# Patient Record
Sex: MALE | Race: BLACK OR AFRICAN AMERICAN | NOT HISPANIC OR LATINO | Employment: UNEMPLOYED | URBAN - METROPOLITAN AREA
[De-identification: names, ages, dates, MRNs, and addresses within clinical notes are randomized per-mention and may not be internally consistent; named-entity substitution may affect disease eponyms.]

---

## 2024-07-04 ENCOUNTER — HOSPITAL ENCOUNTER (INPATIENT)
Facility: HOSPITAL | Age: 74
LOS: 2 days | Discharge: HOME | DRG: 189 | End: 2024-07-06
Attending: EMERGENCY MEDICINE | Admitting: STUDENT IN AN ORGANIZED HEALTH CARE EDUCATION/TRAINING PROGRAM
Payer: MEDICARE

## 2024-07-04 DIAGNOSIS — J45.21 MILD INTERMITTENT ASTHMA WITH ACUTE EXACERBATION: Primary | ICD-10-CM

## 2024-07-04 DIAGNOSIS — R06.02 SHORTNESS OF BREATH: ICD-10-CM

## 2024-07-04 PROBLEM — D72.10 EOSINOPHILIA: Status: ACTIVE | Noted: 2024-07-04

## 2024-07-04 PROBLEM — J21.9 ACUTE BRONCHIOLITIS: Status: ACTIVE | Noted: 2024-07-04

## 2024-07-04 PROBLEM — J96.02 ACUTE HYPERCAPNIC RESPIRATORY FAILURE: Status: ACTIVE | Noted: 2024-07-04

## 2024-07-04 LAB
ALBUMIN SERPL BCP-MCNC: 3.9 G/DL (ref 3.5–5.2)
ALLENS TEST: ABNORMAL
ALLENS TEST: ABNORMAL
ALP SERPL-CCNC: 86 U/L (ref 55–135)
ALT SERPL W/O P-5'-P-CCNC: 23 U/L (ref 10–44)
ANION GAP SERPL CALC-SCNC: 11 MMOL/L (ref 8–16)
AST SERPL-CCNC: 27 U/L (ref 10–40)
BASOPHILS # BLD AUTO: 0.05 K/UL (ref 0–0.2)
BASOPHILS NFR BLD: 0.5 % (ref 0–1.9)
BILIRUB SERPL-MCNC: 0.6 MG/DL (ref 0.1–1)
BNP SERPL-MCNC: 236 PG/ML (ref 0–99)
BUN SERPL-MCNC: 10 MG/DL (ref 8–23)
CALCIUM SERPL-MCNC: 9.2 MG/DL (ref 8.7–10.5)
CHLORIDE SERPL-SCNC: 103 MMOL/L (ref 95–110)
CO2 SERPL-SCNC: 24 MMOL/L (ref 23–29)
CREAT SERPL-MCNC: 0.9 MG/DL (ref 0.5–1.4)
DELSYS: ABNORMAL
DELSYS: ABNORMAL
DIFFERENTIAL METHOD BLD: ABNORMAL
EOSINOPHIL # BLD AUTO: 1.1 K/UL (ref 0–0.5)
EOSINOPHIL NFR BLD: 10.3 % (ref 0–8)
EP: 8
EP: 8
ERYTHROCYTE [DISTWIDTH] IN BLOOD BY AUTOMATED COUNT: 13.1 % (ref 11.5–14.5)
ERYTHROCYTE [SEDIMENTATION RATE] IN BLOOD BY WESTERGREN METHOD: 8 MM/H
ERYTHROCYTE [SEDIMENTATION RATE] IN BLOOD BY WESTERGREN METHOD: 8 MM/H
EST. GFR  (NO RACE VARIABLE): >60 ML/MIN/1.73 M^2
FIO2: 40
FIO2: 40
GLUCOSE SERPL-MCNC: 187 MG/DL (ref 70–110)
HCO3 UR-SCNC: 27.2 MMOL/L (ref 24–28)
HCO3 UR-SCNC: 28.2 MMOL/L (ref 24–28)
HCT VFR BLD AUTO: 48.7 % (ref 40–54)
HGB BLD-MCNC: 15.3 G/DL (ref 14–18)
IMM GRANULOCYTES # BLD AUTO: 0.04 K/UL (ref 0–0.04)
IMM GRANULOCYTES NFR BLD AUTO: 0.4 % (ref 0–0.5)
IP: 14
IP: 14
LYMPHOCYTES # BLD AUTO: 3.4 K/UL (ref 1–4.8)
LYMPHOCYTES NFR BLD: 32.5 % (ref 18–48)
MCH RBC QN AUTO: 28.7 PG (ref 27–31)
MCHC RBC AUTO-ENTMCNC: 31.4 G/DL (ref 32–36)
MCV RBC AUTO: 91 FL (ref 82–98)
MODE: ABNORMAL
MODE: ABNORMAL
MONOCYTES # BLD AUTO: 0.6 K/UL (ref 0.3–1)
MONOCYTES NFR BLD: 6 % (ref 4–15)
NEUTROPHILS # BLD AUTO: 5.3 K/UL (ref 1.8–7.7)
NEUTROPHILS NFR BLD: 50.3 % (ref 38–73)
NRBC BLD-RTO: 0 /100 WBC
OHS QRS DURATION: 166 MS
OHS QTC CALCULATION: 491 MS
PCO2 BLDA: 60.6 MMHG (ref 35–45)
PCO2 BLDA: 69.1 MMHG (ref 35–45)
PH SMN: 7.22 [PH] (ref 7.35–7.45)
PH SMN: 7.26 [PH] (ref 7.35–7.45)
PLATELET # BLD AUTO: 250 K/UL (ref 150–450)
PMV BLD AUTO: 10.2 FL (ref 9.2–12.9)
PO2 BLDA: 120 MMHG (ref 40–60)
PO2 BLDA: 195 MMHG (ref 80–100)
POC BE: -2 MMOL/L
POC BE: -2 MMOL/L
POC SATURATED O2: 98 % (ref 95–100)
POC SATURATED O2: 99 % (ref 95–100)
POC TCO2: 29 MMOL/L (ref 23–27)
POC TCO2: 30 MMOL/L (ref 24–29)
POTASSIUM SERPL-SCNC: 4.4 MMOL/L (ref 3.5–5.1)
PROT SERPL-MCNC: 8.8 G/DL (ref 6–8.4)
RBC # BLD AUTO: 5.34 M/UL (ref 4.6–6.2)
SAMPLE: ABNORMAL
SAMPLE: ABNORMAL
SITE: ABNORMAL
SITE: ABNORMAL
SODIUM SERPL-SCNC: 138 MMOL/L (ref 136–145)
TROPONIN I SERPL DL<=0.01 NG/ML-MCNC: 0.01 NG/ML (ref 0–0.03)
WBC # BLD AUTO: 10.47 K/UL (ref 3.9–12.7)

## 2024-07-04 PROCEDURE — 99900035 HC TECH TIME PER 15 MIN (STAT)

## 2024-07-04 PROCEDURE — 11000001 HC ACUTE MED/SURG PRIVATE ROOM

## 2024-07-04 PROCEDURE — 94640 AIRWAY INHALATION TREATMENT: CPT

## 2024-07-04 PROCEDURE — 83880 ASSAY OF NATRIURETIC PEPTIDE: CPT | Performed by: EMERGENCY MEDICINE

## 2024-07-04 PROCEDURE — 99291 CRITICAL CARE FIRST HOUR: CPT

## 2024-07-04 PROCEDURE — 63700000 PHARM REV CODE 250 ALT 637 W/O HCPCS: Performed by: INTERNAL MEDICINE

## 2024-07-04 PROCEDURE — 27000190 HC CPAP FULL FACE MASK W/VALVE

## 2024-07-04 PROCEDURE — 94644 CONT INHLJ TX 1ST HOUR: CPT

## 2024-07-04 PROCEDURE — 63600175 PHARM REV CODE 636 W HCPCS: Performed by: INTERNAL MEDICINE

## 2024-07-04 PROCEDURE — 5A09357 ASSISTANCE WITH RESPIRATORY VENTILATION, LESS THAN 24 CONSECUTIVE HOURS, CONTINUOUS POSITIVE AIRWAY PRESSURE: ICD-10-PCS | Performed by: EMERGENCY MEDICINE

## 2024-07-04 PROCEDURE — 80053 COMPREHEN METABOLIC PANEL: CPT | Performed by: EMERGENCY MEDICINE

## 2024-07-04 PROCEDURE — 27000221 HC OXYGEN, UP TO 24 HOURS

## 2024-07-04 PROCEDURE — 93010 ELECTROCARDIOGRAM REPORT: CPT | Mod: ,,, | Performed by: INTERNAL MEDICINE

## 2024-07-04 PROCEDURE — 82803 BLOOD GASES ANY COMBINATION: CPT

## 2024-07-04 PROCEDURE — 25000242 PHARM REV CODE 250 ALT 637 W/ HCPCS: Performed by: EMERGENCY MEDICINE

## 2024-07-04 PROCEDURE — 96365 THER/PROPH/DIAG IV INF INIT: CPT

## 2024-07-04 PROCEDURE — 63600175 PHARM REV CODE 636 W HCPCS: Performed by: EMERGENCY MEDICINE

## 2024-07-04 PROCEDURE — 99223 1ST HOSP IP/OBS HIGH 75: CPT | Mod: ,,, | Performed by: INTERNAL MEDICINE

## 2024-07-04 PROCEDURE — 94761 N-INVAS EAR/PLS OXIMETRY MLT: CPT | Mod: XB

## 2024-07-04 PROCEDURE — 63600175 PHARM REV CODE 636 W HCPCS: Performed by: STUDENT IN AN ORGANIZED HEALTH CARE EDUCATION/TRAINING PROGRAM

## 2024-07-04 PROCEDURE — 36600 WITHDRAWAL OF ARTERIAL BLOOD: CPT

## 2024-07-04 PROCEDURE — 87633 RESP VIRUS 12-25 TARGETS: CPT | Performed by: INTERNAL MEDICINE

## 2024-07-04 PROCEDURE — 85025 COMPLETE CBC W/AUTO DIFF WBC: CPT | Performed by: EMERGENCY MEDICINE

## 2024-07-04 PROCEDURE — 25000242 PHARM REV CODE 250 ALT 637 W/ HCPCS: Performed by: INTERNAL MEDICINE

## 2024-07-04 PROCEDURE — 84484 ASSAY OF TROPONIN QUANT: CPT | Performed by: EMERGENCY MEDICINE

## 2024-07-04 PROCEDURE — 96375 TX/PRO/DX INJ NEW DRUG ADDON: CPT

## 2024-07-04 PROCEDURE — 94799 UNLISTED PULMONARY SVC/PX: CPT

## 2024-07-04 PROCEDURE — 93005 ELECTROCARDIOGRAM TRACING: CPT

## 2024-07-04 RX ORDER — ALBUTEROL SULFATE 2.5 MG/.5ML
2.5 SOLUTION RESPIRATORY (INHALATION) EVERY 4 HOURS PRN
Status: DISCONTINUED | OUTPATIENT
Start: 2024-07-04 | End: 2024-07-06 | Stop reason: HOSPADM

## 2024-07-04 RX ORDER — SODIUM CHLORIDE 0.9 % (FLUSH) 0.9 %
3 SYRINGE (ML) INJECTION
Status: DISCONTINUED | OUTPATIENT
Start: 2024-07-04 | End: 2024-07-06 | Stop reason: HOSPADM

## 2024-07-04 RX ORDER — ACETAMINOPHEN 325 MG/1
650 TABLET ORAL EVERY 4 HOURS PRN
Status: DISCONTINUED | OUTPATIENT
Start: 2024-07-04 | End: 2024-07-06 | Stop reason: HOSPADM

## 2024-07-04 RX ORDER — ALBUTEROL SULFATE 2.5 MG/.5ML
10 SOLUTION RESPIRATORY (INHALATION)
Status: COMPLETED | OUTPATIENT
Start: 2024-07-04 | End: 2024-07-04

## 2024-07-04 RX ORDER — IPRATROPIUM BROMIDE AND ALBUTEROL SULFATE 2.5; .5 MG/3ML; MG/3ML
3 SOLUTION RESPIRATORY (INHALATION)
Status: COMPLETED | OUTPATIENT
Start: 2024-07-04 | End: 2024-07-04

## 2024-07-04 RX ORDER — ENOXAPARIN SODIUM 100 MG/ML
40 INJECTION SUBCUTANEOUS EVERY 24 HOURS
Status: DISCONTINUED | OUTPATIENT
Start: 2024-07-04 | End: 2024-07-06 | Stop reason: HOSPADM

## 2024-07-04 RX ORDER — ALBUTEROL SULFATE 2.5 MG/.5ML
7.5 SOLUTION RESPIRATORY (INHALATION)
Status: COMPLETED | OUTPATIENT
Start: 2024-07-04 | End: 2024-07-04

## 2024-07-04 RX ORDER — DEXAMETHASONE SODIUM PHOSPHATE 4 MG/ML
10 INJECTION, SOLUTION INTRA-ARTICULAR; INTRALESIONAL; INTRAMUSCULAR; INTRAVENOUS; SOFT TISSUE
Status: COMPLETED | OUTPATIENT
Start: 2024-07-04 | End: 2024-07-04

## 2024-07-04 RX ORDER — MAGNESIUM SULFATE HEPTAHYDRATE 40 MG/ML
2 INJECTION, SOLUTION INTRAVENOUS ONCE
Status: COMPLETED | OUTPATIENT
Start: 2024-07-04 | End: 2024-07-04

## 2024-07-04 RX ORDER — AZITHROMYCIN 250 MG/1
500 TABLET, FILM COATED ORAL DAILY
Status: DISCONTINUED | OUTPATIENT
Start: 2024-07-04 | End: 2024-07-06 | Stop reason: HOSPADM

## 2024-07-04 RX ORDER — BUDESONIDE 0.25 MG/2ML
0.25 INHALANT ORAL EVERY 12 HOURS
Status: DISCONTINUED | OUTPATIENT
Start: 2024-07-04 | End: 2024-07-06 | Stop reason: HOSPADM

## 2024-07-04 RX ORDER — METHYLPREDNISOLONE SOD SUCC 125 MG
125 VIAL (EA) INJECTION EVERY 6 HOURS
Status: COMPLETED | OUTPATIENT
Start: 2024-07-04 | End: 2024-07-05

## 2024-07-04 RX ADMIN — IPRATROPIUM BROMIDE AND ALBUTEROL SULFATE 3 ML: 2.5; .5 SOLUTION RESPIRATORY (INHALATION) at 10:07

## 2024-07-04 RX ADMIN — METHYLPREDNISOLONE SODIUM SUCCINATE 125 MG: 125 INJECTION, POWDER, FOR SOLUTION INTRAMUSCULAR; INTRAVENOUS at 05:07

## 2024-07-04 RX ADMIN — BUDESONIDE 0.25 MG: 0.25 INHALANT RESPIRATORY (INHALATION) at 08:07

## 2024-07-04 RX ADMIN — ENOXAPARIN SODIUM 40 MG: 40 INJECTION SUBCUTANEOUS at 05:07

## 2024-07-04 RX ADMIN — ALBUTEROL SULFATE 10 MG: 2.5 SOLUTION RESPIRATORY (INHALATION) at 01:07

## 2024-07-04 RX ADMIN — ALBUTEROL SULFATE 7.5 MG: 2.5 SOLUTION RESPIRATORY (INHALATION) at 10:07

## 2024-07-04 RX ADMIN — DEXAMETHASONE SODIUM PHOSPHATE 10 MG: 4 INJECTION INTRA-ARTICULAR; INTRALESIONAL; INTRAMUSCULAR; INTRAVENOUS; SOFT TISSUE at 10:07

## 2024-07-04 RX ADMIN — METHYLPREDNISOLONE SODIUM SUCCINATE 125 MG: 125 INJECTION, POWDER, FOR SOLUTION INTRAMUSCULAR; INTRAVENOUS at 11:07

## 2024-07-04 RX ADMIN — AZITHROMYCIN DIHYDRATE 500 MG: 250 TABLET ORAL at 12:07

## 2024-07-04 RX ADMIN — MAGNESIUM SULFATE HEPTAHYDRATE 2 G: 40 INJECTION, SOLUTION INTRAVENOUS at 11:07

## 2024-07-04 NOTE — NURSING
Pt arrived to the unit via stretcher. Pt on 5L NC, but able to ambulate to bed. /77 p. 85. Wife at bedside.  All questions answered. NAD noted.

## 2024-07-04 NOTE — SUBJECTIVE & OBJECTIVE
Past Medical History:   Diagnosis Date    Asthma        History reviewed. No pertinent surgical history.    Review of patient's allergies indicates:  No Known Allergies    No current facility-administered medications on file prior to encounter.     No current outpatient medications on file prior to encounter.     Family History    None       Tobacco Use    Smoking status: Former     Types: Cigarettes    Smokeless tobacco: Never   Substance and Sexual Activity    Alcohol use: Not on file    Drug use: Never    Sexual activity: Not Currently     Review of Systems  Objective:     Vital Signs (Most Recent):  Temp: 97.1 °F (36.2 °C) (07/04/24 1032)  Pulse: 108 (07/04/24 1435)  Resp: 20 (07/04/24 1435)  BP: 131/72 (07/04/24 1432)  SpO2: 99 % (07/04/24 1435) Vital Signs (24h Range):  Temp:  [97.1 °F (36.2 °C)] 97.1 °F (36.2 °C)  Pulse:  [100-122] 108  Resp:  [19-24] 20  SpO2:  [96 %-100 %] 99 %  BP: (120-172)/() 131/72     Weight: 103.1 kg (227 lb 4.7 oz)  Body mass index is 31.7 kg/m².     Physical Exam  Vitals and nursing note reviewed.   Constitutional:       General: He is not in acute distress.     Appearance: Normal appearance. He is ill-appearing.   Cardiovascular:      Rate and Rhythm: Normal rate and regular rhythm.      Pulses: Normal pulses.   Pulmonary:      Effort: Pulmonary effort is normal.      Breath sounds: Wheezing present.   Abdominal:      General: Bowel sounds are normal. There is no distension.   Musculoskeletal:         General: No swelling.   Neurological:      General: No focal deficit present.      Mental Status: He is alert.   Psychiatric:         Mood and Affect: Mood normal.                Significant Labs: All pertinent labs within the past 24 hours have been reviewed.    Significant Imaging: I have reviewed all pertinent imaging results/findings within the past 24 hours.

## 2024-07-04 NOTE — H&P
"  [facility] Medicine  History & Physical    Patient Name: Kai Guerrero  MRN: [id number]  Patient Class: IP- Inpatient  Admission Date: 7/4/2024  Attending Physician: David Best MD   Primary Care Provider: No primary care provider on file.         Patient information was obtained from patient, spouse/SO, past medical records, and ER records.     Subjective:     Principal Problem:Mild intermittent asthma with acute exacerbation    Chief Complaint:   Chief Complaint   Patient presents with    Shortness of Breath        HPI: Mr. Guerrero is a 75 yo M with PMHx reportedly asthma who presents to the ED for evaluation of shortness of breath. He reports this has been going on for a "long time" but he was being stubborn and did not want to come. He then decided to be evaluated due to encouragement from others. He denies any fevers or chills, no recent sick contacts. He reports having a similar episode about 2 years ago. He has an albuterol inhaler that he uses as needed but no maintenance inhalers that he takes scheduled daily. He was given IV steroids, continuous nebulizers and supplemental oxygen in the Ed. He is now on nasal cannula and overall feeling better than on presentation. He has continued wheezing and oxygen requirements so he will be admitted to the hospital for further management.    Past Medical History:   Diagnosis Date    Asthma        History reviewed. No pertinent surgical history.    Review of patient's allergies indicates:  No Known Allergies    No current facility-administered medications on file prior to encounter.     No current outpatient medications on file prior to encounter.     Family History    None       Tobacco Use    Smoking status: Former     Types: Cigarettes    Smokeless tobacco: Never   Substance and Sexual Activity    Alcohol use: Not on file    Drug use: Never    Sexual activity: Not Currently     Review of Systems  Objective:     Vital Signs (Most Recent):  Temp: " 97.1 °F (36.2 °C) (07/04/24 1032)  Pulse: 108 (07/04/24 1435)  Resp: 20 (07/04/24 1435)  BP: 131/72 (07/04/24 1432)  SpO2: 99 % (07/04/24 1435) Vital Signs (24h Range):  Temp:  [97.1 °F (36.2 °C)] 97.1 °F (36.2 °C)  Pulse:  [100-122] 108  Resp:  [19-24] 20  SpO2:  [96 %-100 %] 99 %  BP: (120-172)/() 131/72     Weight: 103.1 kg (227 lb 4.7 oz)  Body mass index is 31.7 kg/m².     Physical Exam  Vitals and nursing note reviewed.   Constitutional:       General: He is not in acute distress.     Appearance: Normal appearance. He is ill-appearing.   Cardiovascular:      Rate and Rhythm: Normal rate and regular rhythm.      Pulses: Normal pulses.   Pulmonary:      Effort: Pulmonary effort is normal.      Breath sounds: Wheezing present.   Abdominal:      General: Bowel sounds are normal. There is no distension.   Musculoskeletal:         General: No swelling.   Neurological:      General: No focal deficit present.      Mental Status: He is alert.   Psychiatric:         Mood and Affect: Mood normal.                Significant Labs: All pertinent labs within the past 24 hours have been reviewed.    Significant Imaging: I have reviewed all pertinent imaging results/findings within the past 24 hours.  Assessment/Plan:     * Mild intermittent asthma with acute exacerbation  - Continue with solumedrol 125 q6 per Pulmonology  - budesonide bid  - PRN albuterol nebulizers  - RIP pending  - azithromycin      Eosinophilia  Recheck as outpatient without exacerbation      Acute bronchiolitis        Acute hypercapnic respiratory failure  Patient with Hypercapnic and Hypoxic Respiratory failure which is Acute.  he is not on home oxygen. Supplemental oxygen was provided and noted-      .   Signs/symptoms of respiratory failure include- tachypnea. Contributing diagnoses includes -  copd/asthma?   Labs and images were reviewed. Patient Has recent ABG, which has been reviewed. Will treat underlying causes and adjust management of  respiratory failure as follows-   - treat underlying issues as above  - needs PFT as outpatient      VTE Risk Mitigation (From admission, onward)           Ordered     IP VTE HIGH RISK PATIENT  Once         07/04/24 1448     Place sequential compression device  Until discontinued         07/04/24 1448                                    David Best MD  Department of Hospital Medicine  [facility] [place] - [facility]

## 2024-07-04 NOTE — ASSESSMENT & PLAN NOTE
Possibly asthma related. ? Exposure.  Needs to be repeated outpatient outside of acute exacerbation.

## 2024-07-04 NOTE — ASSESSMENT & PLAN NOTE
Patient with Hypercapnic and Hypoxic Respiratory failure which is Acute.  he is not on home oxygen. Supplemental oxygen was provided and noted-      .   Signs/symptoms of respiratory failure include- tachypnea. Contributing diagnoses includes -  copd/asthma?   Labs and images were reviewed. Patient Has recent ABG, which has been reviewed. Will treat underlying causes and adjust management of respiratory failure as follows-   - treat underlying issues as above  - needs PFT as outpatient

## 2024-07-04 NOTE — ASSESSMENT & PLAN NOTE
Patient with Hypercapnic Respiratory failure which is Acute.  he is not on home oxygen. Supplemental oxygen was provided and noted-      .   Signs/symptoms of respiratory failure include- tachypnea, increased work of breathing, and respiratory distress. Contributing diagnoses includes -  Asthma, Bronchiolitis  Labs and images were reviewed. Patient Has recent ABG, which has been reviewed. Will treat underlying causes and adjust management of respiratory failure as follows- BiPAP to continue and reduce O2 to minimize CO2 retention.

## 2024-07-04 NOTE — ASSESSMENT & PLAN NOTE
- Continue with solumedrol 125 q6 per Pulmonology  - budesonide bid  - PRN albuterol nebulizers  - RIP pending  - azithromycin

## 2024-07-04 NOTE — HPI
"Mr. Guerrero is a 75 yo M with PMHx reportedly asthma who presents to the ED for evaluation of shortness of breath. He reports this has been going on for a "long time" but he was being stubborn and did not want to come. He then decided to be evaluated due to encouragement from others. He denies any fevers or chills, no recent sick contacts. He reports having a similar episode about 2 years ago. He has an albuterol inhaler that he uses as needed but no maintenance inhalers that he takes scheduled daily. He was given IV steroids, continuous nebulizers and supplemental oxygen in the Ed. He is now on nasal cannula and overall feeling better than on presentation. He has continued wheezing and oxygen requirements so he will be admitted to the hospital for further management.  "

## 2024-07-04 NOTE — SUBJECTIVE & OBJECTIVE
Past Medical History:   Diagnosis Date    Asthma        History reviewed. No pertinent surgical history.    Review of patient's allergies indicates:  No Known Allergies    Family History    None       Tobacco Use    Smoking status: Former     Types: Cigarettes    Smokeless tobacco: Never   Substance and Sexual Activity    Alcohol use: Not on file    Drug use: Never    Sexual activity: Not Currently         Review of Systems   Unable to perform ROS: Severe respiratory distress     Objective:     Vital Signs (Most Recent):  Temp: 97.1 °F (36.2 °C) (07/04/24 1032)  Pulse: 107 (07/04/24 1154)  Resp: (!) 21 (07/04/24 1154)  BP: 120/69 (07/04/24 1133)  SpO2: 100 % (07/04/24 1154) Vital Signs (24h Range):  Temp:  [97.1 °F (36.2 °C)] 97.1 °F (36.2 °C)  Pulse:  [107-122] 107  Resp:  [19-24] 21  SpO2:  [96 %-100 %] 100 %  BP: (120-172)/() 120/69     Weight: 103.1 kg (227 lb 4.7 oz)  Body mass index is 31.7 kg/m².    No intake or output data in the 24 hours ending 07/04/24 1221     Physical Exam  Vitals reviewed.   Constitutional:       General: He is in acute distress.      Appearance: He is normal weight. He is ill-appearing.   HENT:      Head: Normocephalic and atraumatic.      Mouth/Throat:      Comments: BiPAP mask in place without air leak  Eyes:      Extraocular Movements: Extraocular movements intact.      Pupils: Pupils are equal, round, and reactive to light.   Cardiovascular:      Rate and Rhythm: Regular rhythm. Tachycardia present.      Pulses: Normal pulses.      Heart sounds: Normal heart sounds.   Pulmonary:      Effort: Respiratory distress present.      Comments: BiPAP dependent.  Severe musical inspiratory and expiratory wheezing.   Multiple chest wall keloids.   Abdominal:      General: Abdomen is flat.      Palpations: Abdomen is soft.   Musculoskeletal:      Right lower leg: No edema.      Left lower leg: No edema.   Skin:     General: Skin is warm and dry.      Capillary Refill: Capillary refill  takes less than 2 seconds.   Neurological:      General: No focal deficit present.      Mental Status: He is alert.      Comments: Slightly disoriented/ unable to answer specific questions which I suspect is more related to respiratory distress   Psychiatric:         Mood and Affect: Mood normal.         Behavior: Behavior normal.          Vents:       Lines/Drains/Airways       Peripheral Intravenous Line  Duration                  Peripheral IV - Single Lumen 07/04/24 1032 20 G Anterior;Left Forearm <1 day         Peripheral IV - Single Lumen 07/04/24 1045 20 G Right Antecubital <1 day                    Significant Labs:    CBC/Anemia Profile:  Recent Labs   Lab 07/04/24  1059   WBC 10.47   HGB 15.3   HCT 48.7      MCV 91   RDW 13.1        Chemistries:  Recent Labs   Lab 07/04/24  1059      K 4.4      CO2 24   BUN 10   CREATININE 0.9   CALCIUM 9.2   ALBUMIN 3.9   PROT 8.8*   BILITOT 0.6   ALKPHOS 86   ALT 23   AST 27       ABGs:   Recent Labs   Lab 07/04/24  1153   PH 7.259*   PCO2 60.6*   HCO3 27.2   POCSATURATED 99   BE -2     All pertinent labs within the past 24 hours have been reviewed.    Significant Imaging:   I have reviewed all pertinent imaging results/findings within the past 24 hours.  CXR: I have reviewed all pertinent results/findings within the past 24 hours and my personal findings are:  bilateral interstitial infiltrates worse in bases  Tely shows sinus tachycardia

## 2024-07-04 NOTE — ED PROVIDER NOTES
Encounter Date: 7/4/2024       History     Chief Complaint   Patient presents with    Shortness of Breath     HPI  This 74-year-old asthmatic presents emergency room with shortness of breath and bilateral wheezing transported by EMS.  The patient denies chest pain ankle swelling.  He is otherwise well he has had symptoms for about a week.  He arrives on BiPAP.  He has no other complaints.  There is no history of fever sputum production.  Review of patient's allergies indicates:  No Known Allergies  Past Medical History:   Diagnosis Date    Asthma      History reviewed. No pertinent surgical history.  No family history on file.  Social History     Tobacco Use    Smoking status: Former     Types: Cigarettes    Smokeless tobacco: Never   Substance Use Topics    Drug use: Never     Review of Systems  The patient was questioned specifically with regard to the following.  General: Fever, chills, sweats. Neuro: Headache. Eyes: eye problems. ENT: Ear pain, sore throat. Cardiovascular: Chest pain. Respiratory: Cough, shortness of breath. Gastrointestinal: Abdominal pain, vomiting, diarrhea. Genitourinary: Painful urination.  Musculoskeletal: Arm and leg problems. Skin: Rash.  The review of systems was negative except for the following:  Shortness of breath  Physical Exam     Initial Vitals [07/04/24 1032]   BP Pulse Resp Temp SpO2   (!) 166/120 (!) 117 (!) 24 97.1 °F (36.2 °C) 100 %      MAP       --         Physical Exam  The patient was examined specifically for the following:   General:No significant distress, Good color, Warm and dry. Head and neck:Scalp atraumatic, Neck supple. Neurological:Appropriate conversation, Gross motor deficits. Eyes:Conjugate gaze, Clear corneas. ENT: No epistaxis. Cardiac: Regular rate and rhythm, Grossly normal heart tones. Pulmonary: Wheezing, Rales. Gastrointestinal: Abdominal tenderness, Abdominal distention. Musculoskeletal: Extremity deformity, Apparent pain with range of motion of the  joints. Skin: Rash.   The findings on examination were normal except for the following:   ED Course   Critical Care    Date/Time: 7/4/2024 1:49 PM    Performed by: Marc Vincent MD  Authorized by: Marc Vincent MD  Direct patient critical care time: 22 minutes  Additional history critical care time: 11 minutes  Ordering / reviewing critical care time: 11 minutes  Documentation critical care time: 11 minutes  Consulting other physicians critical care time: 11 minutes  Total critical care time (exclusive of procedural time) : 66 minutes  Critical care time was exclusive of separately billable procedures and treating other patients and teaching time.  Critical care was necessary to treat or prevent imminent or life-threatening deterioration of the following conditions: respiratory failure.  Critical care was time spent personally by me on the following activities: development of treatment plan with patient or surrogate, discussions with primary provider, interpretation of cardiac output measurements, evaluation of patient's response to treatment, examination of patient, obtaining history from patient or surrogate, ordering and performing treatments and interventions, ordering and review of laboratory studies, ordering and review of radiographic studies, pulse oximetry, re-evaluation of patient's condition and review of old charts.        Labs Reviewed   B-TYPE NATRIURETIC PEPTIDE - Abnormal; Notable for the following components:       Result Value     (*)     All other components within normal limits   CBC W/ AUTO DIFFERENTIAL - Abnormal; Notable for the following components:    MCHC 31.4 (*)     Eos # 1.1 (*)     Eosinophil % 10.3 (*)     All other components within normal limits   COMPREHENSIVE METABOLIC PANEL - Abnormal; Notable for the following components:    Glucose 187 (*)     Total Protein 8.8 (*)     All other components within normal limits   ISTAT PROCEDURE - Abnormal; Notable for the  following components:    POC PH 7.218 (*)     POC PCO2 69.1 (*)     POC PO2 120 (*)     POC HCO3 28.2 (*)     POC TCO2 30 (*)     All other components within normal limits   ISTAT PROCEDURE - Abnormal; Notable for the following components:    POC PH 7.259 (*)     POC PCO2 60.6 (*)     POC PO2 195 (*)     POC TCO2 29 (*)     All other components within normal limits   RESPIRATORY INFECTION PANEL (PCR), NASOPHARYNGEAL   TROPONIN I     EKG Readings: (Independently Interpreted)   This patient is in a sinus tachycardia with a heart rate of 121.  There are nonspecific ST segment and T-wave changes there is no definite evidence of acute myocardial infarction or malignant arrhythmia.  There is right axis deviation there is a left bundle branch block.  This is doctor Vincent dictating an I independently interpreted this EKG.        Imaging Results              X-Ray Chest AP Portable (In process)                      Medications   budesonide nebulizer solution 0.25 mg (has no administration in time range)   methylPREDNISolone sodium succinate injection 125 mg (has no administration in time range)   azithromycin tablet 500 mg (500 mg Oral Given 7/4/24 1253)   albuterol sulfate nebulizer solution 2.5 mg (has no administration in time range)   dexAMETHasone injection 10 mg (10 mg Intravenous Given 7/4/24 1059)   albuterol-ipratropium 2.5 mg-0.5 mg/3 mL nebulizer solution 3 mL (3 mLs Nebulization Given 7/4/24 1056)   albuterol sulfate nebulizer solution 7.5 mg (7.5 mg Nebulization Given 7/4/24 1056)   magnesium sulfate 2g in water 50mL IVPB (premix) (2 g Intravenous Back Association 7/4/24 1200)   albuterol sulfate nebulizer solution 10 mg (10 mg Nebulization Given 7/4/24 1321)     Medical Decision Making  Amount and/or Complexity of Data Reviewed  Labs: ordered.  Radiology: ordered.    Risk  Prescription drug management.    Given the above, this patient presents emergency with severe asthma.  He was treated with multiple  nebulizer treatments magnesium IV steroids and he improved markedly during stay in the emergency room.  We were able to get him off of BiPAP.  We were able to convert him to a high-flow nasal cannula.  I believe this patient is stable for the floor.  Chest x-ray fails to reveal infiltrates by my interpretation.  The patient reports no shortness of breath at 1:48 p.m. me is 98% on his high-flow nasal cannula.  Chest x-ray fails to reveal evidence of pulmonary edema.  The patient did have a acidosis with a pH is 7.25 on arrival.  His BNP is slightly elevated to 36 I doubt congestive heart failure the troponin is negative I doubt myocardial infarction chemistries are interesting for glucose of 187.  The patient may have some borderline diabetes [place] [id number] hemoglobin and hematocrit are normal.  Chest x-ray is unremarkable by my interpretation.                                  Clinical Impression:  Final diagnoses:  [R06.02] Shortness of breath                 Marc Vicnent MD  07/04/24 9560

## 2024-07-04 NOTE — ASSESSMENT & PLAN NOTE
Once outside of acute exacerbation will need to better clarify baseline symptoms.  Will need baseline PFTs as outpatient.  Based on infectious workup, will determine outpatient inhaler maintenance for patient.  Albuterol nebs as needed and budesonide nebs scheduled.

## 2024-07-04 NOTE — CONSULTS
[facility] Emergency Dept  Pulmonology  Consult Note    Patient Name: Kai Guerrero  MRN: [id number]  Admission Date: 7/4/2024  Hospital Length of Stay: 0 days  Code Status: No Order  Attending Physician: Marc Vincent MD  Primary Care Provider: No primary care provider on file.   Principal Problem: <principal problem not specified>    Inpatient consult to Critical Care Medicine  Consult performed by: Jewel Street MD  Consult ordered by: Marc Vincent MD      Inpatient consult to Pulmonology  Consult performed by: Jewel Street MD  Consult ordered by: Marc Vincent MD        Subjective:     HPI:  75 yo male with a reported history of asthma that started an unknown period ago, prior chest wall injuries from [place] who presented with respiratory distress and shortness of breath. He is currently on BiPAP and is much more comfortable but still only able to speak in short sentences and unable to provide a full history. Prior smoking history but quit years ago. Has a diagnosis of asthma with unknown timeline and only uses as needed albuterol inhaler. Told ER had 3 days of symptoms. Only able to tell me he has been sick for much longer.   HR has been mildly elevated consistent with albuterol nebulizer usage in ED.    Past Medical History:   Diagnosis Date    Asthma        History reviewed. No pertinent surgical history.    Review of patient's allergies indicates:  No Known Allergies    Family History    None       Tobacco Use    Smoking status: Former     Types: Cigarettes    Smokeless tobacco: Never   Substance and Sexual Activity    Alcohol use: Not on file    Drug use: Never    Sexual activity: Not Currently         Review of Systems   Unable to perform ROS: Severe respiratory distress     Objective:     Vital Signs (Most Recent):  Temp: 97.1 °F (36.2 °C) (07/04/24 1032)  Pulse: 107 (07/04/24 1154)  Resp: (!) 21 (07/04/24 1154)  BP: 120/69 (07/04/24 1133)  SpO2: 100 % (07/04/24 1154) Vital  Signs (24h Range):  Temp:  [97.1 °F (36.2 °C)] 97.1 °F (36.2 °C)  Pulse:  [107-122] 107  Resp:  [19-24] 21  SpO2:  [96 %-100 %] 100 %  BP: (120-172)/() 120/69     Weight: 103.1 kg (227 lb 4.7 oz)  Body mass index is 31.7 kg/m².    No intake or output data in the 24 hours ending 07/04/24 1221     Physical Exam  Vitals reviewed.   Constitutional:       General: He is in acute distress.      Appearance: He is normal weight. He is ill-appearing.   HENT:      Head: Normocephalic and atraumatic.      Mouth/Throat:      Comments: BiPAP mask in place without air leak  Eyes:      Extraocular Movements: Extraocular movements intact.      Pupils: Pupils are equal, round, and reactive to light.   Cardiovascular:      Rate and Rhythm: Regular rhythm. Tachycardia present.      Pulses: Normal pulses.      Heart sounds: Normal heart sounds.   Pulmonary:      Effort: Respiratory distress present.      Comments: BiPAP dependent.  Severe musical inspiratory and expiratory wheezing.   Multiple chest wall keloids.   Abdominal:      General: Abdomen is flat.      Palpations: Abdomen is soft.   Musculoskeletal:      Right lower leg: No edema.      Left lower leg: No edema.   Skin:     General: Skin is warm and dry.      Capillary Refill: Capillary refill takes less than 2 seconds.   Neurological:      General: No focal deficit present.      Mental Status: He is alert.      Comments: Slightly disoriented/ unable to answer specific questions which I suspect is more related to respiratory distress   Psychiatric:         Mood and Affect: Mood normal.         Behavior: Behavior normal.          Vents:       Lines/Drains/Airways       Peripheral Intravenous Line  Duration                  Peripheral IV - Single Lumen 07/04/24 1032 20 G Anterior;Left Forearm <1 day         Peripheral IV - Single Lumen 07/04/24 1045 20 G Right Antecubital <1 day                    Significant Labs:    CBC/Anemia Profile:  Recent Labs   Lab 07/04/24  1059    WBC 10.47   HGB 15.3   HCT 48.7      MCV 91   RDW 13.1        Chemistries:  Recent Labs   Lab 07/04/24  1059      K 4.4      CO2 24   BUN 10   CREATININE 0.9   CALCIUM 9.2   ALBUMIN 3.9   PROT 8.8*   BILITOT 0.6   ALKPHOS 86   ALT 23   AST 27       ABGs:   Recent Labs   Lab 07/04/24  1153   PH 7.259*   PCO2 60.6*   HCO3 27.2   POCSATURATED 99   BE -2     All pertinent labs within the past 24 hours have been reviewed.    Significant Imaging:   I have reviewed all pertinent imaging results/findings within the past 24 hours.  CXR: I have reviewed all pertinent results/findings within the past 24 hours and my personal findings are:  bilateral interstitial infiltrates worse in bases  Tely shows sinus tachycardia    ABG  Recent Labs   Lab 07/04/24  1153   PH 7.259*   PO2 195*   PCO2 60.6*   HCO3 27.2   BE -2     Assessment/Plan:     Pulmonary  Mild intermittent asthma with acute exacerbation    Once outside of acute exacerbation will need to better clarify baseline symptoms.  Will need baseline PFTs as outpatient.  Based on infectious workup, will determine outpatient inhaler maintenance for patient.  Albuterol nebs as needed and budesonide nebs scheduled.    Acute bronchiolitis  Budesonide Nebs BID.  Azithromycin 500 daily  Resp PCR  Solumedrol 125 q6 x 24 hours    Acute hypercapnic respiratory failure  Patient with Hypercapnic Respiratory failure which is Acute.  he is not on home oxygen. Supplemental oxygen was provided and noted-      .   Signs/symptoms of respiratory failure include- tachypnea, increased work of breathing, and respiratory distress. Contributing diagnoses includes -  Asthma, Bronchiolitis  Labs and images were reviewed. Patient Has recent ABG, which has been reviewed. Will treat underlying causes and adjust management of respiratory failure as follows- BiPAP to continue and reduce O2 to minimize CO2 retention.     Oncology  Eosinophilia  Possibly asthma related. ? Exposure.  Needs  to be repeated outpatient outside of acute exacerbation.          Thank you for your consult. I will follow-up with patient. Please contact us if you have any additional questions.     Jewel Street MD  Pulmonology  [facility] - Emergency Dept

## 2024-07-04 NOTE — HPI
73 yo male with a reported history of asthma that started an unknown period ago, prior chest wall injuries from [place] who presented with respiratory distress and shortness of breath. He is currently on BiPAP and is much more comfortable but still only able to speak in short sentences and unable to provide a full history. Prior smoking history but quit years ago. Has a diagnosis of asthma with unknown timeline and only uses as needed albuterol inhaler. Told ER had 3 days of symptoms. Only able to tell me he has been sick for much longer.   HR has been mildly elevated consistent with albuterol nebulizer usage in ED.

## 2024-07-04 NOTE — NURSING
[facility]  Nurses Note -- 4 Eyes      7/4/2024       Skin assessed on: Q Shift      [x] No Pressure Injuries Present    []Prevention Measures Documented    [] Yes LDA  for Pressure Injury Previously documented     [] Yes New Pressure Injury Discovered   [] LDA for New Pressure Injury Added      Attending RN:  Timothy Kevin LPN     Second RN:  Kelsey RAJAN

## 2024-07-04 NOTE — ED TRIAGE NOTES
Pt presents to ED from home via EMS with c/o shortness of breath x 2 days which was not relieved by his inhaler. CPAP applied while en route to ED, pt denies cp, n/v/d. Reports history of Asthma.   
Home

## 2024-07-05 LAB
ADENOVIRUS: NOT DETECTED
ANION GAP SERPL CALC-SCNC: 8 MMOL/L (ref 8–16)
BASOPHILS # BLD AUTO: 0.01 K/UL (ref 0–0.2)
BASOPHILS NFR BLD: 0.1 % (ref 0–1.9)
BORDETELLA PARAPERTUSSIS (IS1001): NOT DETECTED
BORDETELLA PERTUSSIS (PTXP): NOT DETECTED
BUN SERPL-MCNC: 16 MG/DL (ref 8–23)
CALCIUM SERPL-MCNC: 9.2 MG/DL (ref 8.7–10.5)
CHLAMYDIA PNEUMONIAE: NOT DETECTED
CHLORIDE SERPL-SCNC: 103 MMOL/L (ref 95–110)
CO2 SERPL-SCNC: 26 MMOL/L (ref 23–29)
CORONAVIRUS 229E, COMMON COLD VIRUS: NOT DETECTED
CORONAVIRUS HKU1, COMMON COLD VIRUS: NOT DETECTED
CORONAVIRUS NL63, COMMON COLD VIRUS: NOT DETECTED
CORONAVIRUS OC43, COMMON COLD VIRUS: NOT DETECTED
CREAT SERPL-MCNC: 0.9 MG/DL (ref 0.5–1.4)
DIFFERENTIAL METHOD BLD: ABNORMAL
EOSINOPHIL # BLD AUTO: 0 K/UL (ref 0–0.5)
EOSINOPHIL NFR BLD: 0 % (ref 0–8)
ERYTHROCYTE [DISTWIDTH] IN BLOOD BY AUTOMATED COUNT: 13.1 % (ref 11.5–14.5)
EST. GFR  (NO RACE VARIABLE): >60 ML/MIN/1.73 M^2
FLUBV RNA NPH QL NAA+NON-PROBE: NOT DETECTED
GLUCOSE SERPL-MCNC: 139 MG/DL (ref 70–110)
HCT VFR BLD AUTO: 43.4 % (ref 40–54)
HGB BLD-MCNC: 13.5 G/DL (ref 14–18)
HPIV1 RNA NPH QL NAA+NON-PROBE: NOT DETECTED
HPIV2 RNA NPH QL NAA+NON-PROBE: NOT DETECTED
HPIV3 RNA NPH QL NAA+NON-PROBE: NOT DETECTED
HPIV4 RNA NPH QL NAA+NON-PROBE: NOT DETECTED
HUMAN METAPNEUMOVIRUS: NOT DETECTED
IMM GRANULOCYTES # BLD AUTO: 0.03 K/UL (ref 0–0.04)
IMM GRANULOCYTES NFR BLD AUTO: 0.4 % (ref 0–0.5)
INFLUENZA A (SUBTYPES H1,H1-2009,H3): NOT DETECTED
LYMPHOCYTES # BLD AUTO: 1.3 K/UL (ref 1–4.8)
LYMPHOCYTES NFR BLD: 15.3 % (ref 18–48)
MAGNESIUM SERPL-MCNC: 2.4 MG/DL (ref 1.6–2.6)
MCH RBC QN AUTO: 28.4 PG (ref 27–31)
MCHC RBC AUTO-ENTMCNC: 31.1 G/DL (ref 32–36)
MCV RBC AUTO: 91 FL (ref 82–98)
MONOCYTES # BLD AUTO: 0.1 K/UL (ref 0.3–1)
MONOCYTES NFR BLD: 1 % (ref 4–15)
MYCOPLASMA PNEUMONIAE: NOT DETECTED
NEUTROPHILS # BLD AUTO: 6.9 K/UL (ref 1.8–7.7)
NEUTROPHILS NFR BLD: 83.2 % (ref 38–73)
NRBC BLD-RTO: 0 /100 WBC
PHOSPHATE SERPL-MCNC: 3 MG/DL (ref 2.7–4.5)
PLATELET # BLD AUTO: 215 K/UL (ref 150–450)
PMV BLD AUTO: 10.3 FL (ref 9.2–12.9)
POTASSIUM SERPL-SCNC: 4.8 MMOL/L (ref 3.5–5.1)
RBC # BLD AUTO: 4.76 M/UL (ref 4.6–6.2)
RESPIRATORY INFECTION PANEL SOURCE: NORMAL
RSV RNA NPH QL NAA+NON-PROBE: NOT DETECTED
RV+EV RNA NPH QL NAA+NON-PROBE: NOT DETECTED
SARS-COV-2 RNA RESP QL NAA+PROBE: NOT DETECTED
SODIUM SERPL-SCNC: 137 MMOL/L (ref 136–145)
WBC # BLD AUTO: 8.24 K/UL (ref 3.9–12.7)

## 2024-07-05 PROCEDURE — 36415 COLL VENOUS BLD VENIPUNCTURE: CPT | Performed by: STUDENT IN AN ORGANIZED HEALTH CARE EDUCATION/TRAINING PROGRAM

## 2024-07-05 PROCEDURE — 63600175 PHARM REV CODE 636 W HCPCS: Performed by: STUDENT IN AN ORGANIZED HEALTH CARE EDUCATION/TRAINING PROGRAM

## 2024-07-05 PROCEDURE — 80048 BASIC METABOLIC PNL TOTAL CA: CPT | Performed by: STUDENT IN AN ORGANIZED HEALTH CARE EDUCATION/TRAINING PROGRAM

## 2024-07-05 PROCEDURE — 99233 SBSQ HOSP IP/OBS HIGH 50: CPT | Mod: ,,, | Performed by: INTERNAL MEDICINE

## 2024-07-05 PROCEDURE — 83735 ASSAY OF MAGNESIUM: CPT | Performed by: STUDENT IN AN ORGANIZED HEALTH CARE EDUCATION/TRAINING PROGRAM

## 2024-07-05 PROCEDURE — 63700000 PHARM REV CODE 250 ALT 637 W/O HCPCS: Performed by: INTERNAL MEDICINE

## 2024-07-05 PROCEDURE — 85025 COMPLETE CBC W/AUTO DIFF WBC: CPT | Performed by: STUDENT IN AN ORGANIZED HEALTH CARE EDUCATION/TRAINING PROGRAM

## 2024-07-05 PROCEDURE — 84100 ASSAY OF PHOSPHORUS: CPT | Performed by: STUDENT IN AN ORGANIZED HEALTH CARE EDUCATION/TRAINING PROGRAM

## 2024-07-05 PROCEDURE — 11000001 HC ACUTE MED/SURG PRIVATE ROOM

## 2024-07-05 PROCEDURE — 27000221 HC OXYGEN, UP TO 24 HOURS

## 2024-07-05 PROCEDURE — 63600175 PHARM REV CODE 636 W HCPCS: Performed by: INTERNAL MEDICINE

## 2024-07-05 PROCEDURE — 94640 AIRWAY INHALATION TREATMENT: CPT

## 2024-07-05 PROCEDURE — 25000242 PHARM REV CODE 250 ALT 637 W/ HCPCS: Performed by: INTERNAL MEDICINE

## 2024-07-05 PROCEDURE — 94761 N-INVAS EAR/PLS OXIMETRY MLT: CPT

## 2024-07-05 RX ADMIN — METHYLPREDNISOLONE SODIUM SUCCINATE 125 MG: 125 INJECTION, POWDER, FOR SOLUTION INTRAMUSCULAR; INTRAVENOUS at 05:07

## 2024-07-05 RX ADMIN — AZITHROMYCIN DIHYDRATE 500 MG: 250 TABLET ORAL at 08:07

## 2024-07-05 RX ADMIN — ALBUTEROL SULFATE 2.5 MG: 2.5 SOLUTION RESPIRATORY (INHALATION) at 07:07

## 2024-07-05 RX ADMIN — METHYLPREDNISOLONE SODIUM SUCCINATE 60 MG: 40 INJECTION, POWDER, FOR SOLUTION INTRAMUSCULAR; INTRAVENOUS at 11:07

## 2024-07-05 RX ADMIN — METHYLPREDNISOLONE SODIUM SUCCINATE 60 MG: 40 INJECTION, POWDER, FOR SOLUTION INTRAMUSCULAR; INTRAVENOUS at 05:07

## 2024-07-05 RX ADMIN — METHYLPREDNISOLONE SODIUM SUCCINATE 125 MG: 125 INJECTION, POWDER, FOR SOLUTION INTRAMUSCULAR; INTRAVENOUS at 11:07

## 2024-07-05 RX ADMIN — BUDESONIDE 0.25 MG: 0.25 INHALANT RESPIRATORY (INHALATION) at 07:07

## 2024-07-05 RX ADMIN — ENOXAPARIN SODIUM 40 MG: 40 INJECTION SUBCUTANEOUS at 05:07

## 2024-07-05 RX ADMIN — BUDESONIDE 0.25 MG: 0.25 INHALANT RESPIRATORY (INHALATION) at 08:07

## 2024-07-05 NOTE — ASSESSMENT & PLAN NOTE
Once outside of acute exacerbation will need to better clarify baseline symptoms.  Will need baseline PFTs as outpatient.  Based on infectious workup, will determine outpatient inhaler maintenance for patient.    -Given PCR negative, could have been an environmental exposure    -Plan for 6 weeks of ICS inhaler on discharge  Albuterol nebs as needed and budesonide nebs scheduled.

## 2024-07-05 NOTE — PLAN OF CARE
Problem: COPD (Chronic Obstructive Pulmonary Disease)  Goal: Optimal Chronic Illness Coping  Outcome: Progressing  Goal: Optimal Level of Functional [place]  Outcome: Progressing  Goal: Absence of Infection Signs and Symptoms  Outcome: Progressing  Goal: Improved Oral Intake  Outcome: Progressing  Goal: Effective Oxygenation and Ventilation  Outcome: Progressing

## 2024-07-05 NOTE — SUBJECTIVE & OBJECTIVE
Interval History: feeling better but still with coarse wheezing noted    Review of Systems  Objective:     Vital Signs (Most Recent):  Temp: 98.1 °F (36.7 °C) (07/05/24 1101)  Pulse: 108 (07/05/24 1101)  Resp: 18 (07/05/24 1101)  BP: 112/62 (07/05/24 1101)  SpO2: 97 % (07/05/24 1101) Vital Signs (24h Range):  Temp:  [97.7 °F (36.5 °C)-98.1 °F (36.7 °C)] 98.1 °F (36.7 °C)  Pulse:  [] 108  Resp:  [18-23] 18  SpO2:  [97 %-100 %] 97 %  BP: (107-157)/(61-75) 112/62     Weight: 103.1 kg (227 lb 4.7 oz)  Body mass index is 31.7 kg/m².    Intake/Output Summary (Last 24 hours) at 7/5/2024 1106  Last data filed at 7/5/2024 0409  Gross per 24 hour   Intake 200 ml   Output 490 ml   Net -290 ml         Physical Exam  Vitals and nursing note reviewed.   Constitutional:       General: He is not in acute distress.     Appearance: Normal appearance. He is ill-appearing.   Cardiovascular:      Rate and Rhythm: Normal rate and regular rhythm.      Pulses: Normal pulses.   Pulmonary:      Effort: Pulmonary effort is normal.      Breath sounds: Wheezing present.   Abdominal:      General: Bowel sounds are normal. There is no distension.   Musculoskeletal:         General: No swelling.   Neurological:      General: No focal deficit present.      Mental Status: He is alert.   Psychiatric:         Mood and Affect: Mood normal.             Significant Labs: All pertinent labs within the past 24 hours have been reviewed.    Significant Imaging: I have reviewed all pertinent imaging results/findings within the past 24 hours.

## 2024-07-05 NOTE — ASSESSMENT & PLAN NOTE
Patient with Hypercapnic Respiratory failure which is Acute.  he is not on home oxygen. Supplemental oxygen was provided and noted-      .   Signs/symptoms of respiratory failure include- tachypnea, increased work of breathing, and respiratory distress. Contributing diagnoses includes -  Asthma, Bronchiolitis  Labs and images were reviewed. Patient Has recent ABG, which has been reviewed. Will treat underlying causes and adjust management of respiratory failure as follows- now weaned to nasal cannula. Repeat ABG if any recurrent distress.

## 2024-07-05 NOTE — PLAN OF CARE
Case Management Assessment     [facility]    Patient Arrived From: Home  Existing Help at Home: Yumiko- spouse    Barriers to Discharge: None    Discharge Plan:    A. Home with family   B. Home with family    CM discussed discharge planning with pt. Pt is independent and doesn't use DME. Pt's spouseYumiko will provide transportation home when discharged.        07/05/24 1023   Discharge Assessment   Assessment Type Discharge Planning Assessment   Confirmed/corrected address, phone number and insurance Yes   Confirmed Demographics Correct on Facesheet   Source of Information patient   When was your last doctors appointment? 03/18/24   Communicated RADHA with patient/caregiver Yes   Reason For Admission Mild intermittent asthma with acute exacerbation   People in Home spouse   Facility Arrived From: home   Do you expect to return to your current living situation? Yes   Do you have help at home or someone to help you manage your care at home? Yes   Who are your caregiver(s) and their phone number(s)? yumiko heard (Spouse)  [phone]   Prior to hospitilization cognitive status: Alert/Oriented   Current cognitive status: Alert/Oriented   Walking or Climbing Stairs Difficulty no   Dressing/Bathing Difficulty no   Equipment Currently Used at Home none   Readmission within 30 days? Yes   Patient currently being followed by outpatient case management? No   Do you currently have service(s) that help you manage your care at home? No   Do you take prescription medications? Yes   Do you have prescription coverage? Yes   Coverage MEDICARE - MEDICARE A ONLY, [employer] [place] [place]   Do you have any problems affording any of your prescribed medications? No   Who is going to help you get home at discharge? FAMILY   How do you get to doctors appointments? car, drives self;family or friend will provide   Are you on dialysis? No   Do you take coumadin? No   Discharge Plan A Home with family   Discharge Plan B Home with  family   DME Needed Upon Discharge  other (see comments)  (TBD)   Discharge Plan discussed with: Patient   Transition of Care Barriers None   SDOH   (NONE)   Physical Activity   On average, how many days per week do you engage in moderate to strenuous exercise (like a brisk walk)? 0 days   On average, how many minutes do you engage in exercise at this level? 0 min   Financial Resource Strain   How hard is it for you to pay for the very basics like food, housing, medical care, and heating? Not very   Housing Stability   In the last 12 months, was there a time when you were not able to pay the mortgage or rent on time? N   At any time in the past 12 months, were you homeless or living in a shelter (including now)? N   Transportation Needs   Has the lack of transportation kept you from medical appointments, meetings, work or from getting things needed for daily living? No   Food Insecurity   Within the past 12 months, you worried that your food would run out before you got the money to buy more. Never true   Within the past 12 months, the food you bought just didn't last and you didn't have money to get more. Never true   Stress   Do you feel stress - tense, restless, nervous, or anxious, or unable to sleep at night because your mind is troubled all the time - these days? Not at all   Social Isolation   How often do you feel lonely or isolated from those around you?  Never   Alcohol Use   Q1: How often do you have a drink containing alcohol? Never   Q2: How many drinks containing alcohol do you have on a typical day when you are drinking? None   Q3: How often do you have six or more drinks on one occasion? Never   Utilities   In the past 12 months has the electric, gas, oil, or water company threatened to shut off services in your home? No   Health Literacy   How often do you need to have someone help you when you read instructions, pamphlets, or other written material from your doctor or pharmacy? Never   OTHER    Name(s) of People in Home Mylene- spouse

## 2024-07-05 NOTE — PROGRESS NOTES
"[facility] Medicine  Progress Note    Patient Name: Kai Guerrero  MRN: [id number]  Patient Class: IP- Inpatient   Admission Date: 7/4/2024  Length of Stay: 1 days  Attending Physician: David Best MD  Primary Care Provider: Kristen, Primary Doctor        Subjective:     Principal Problem:Mild intermittent asthma with acute exacerbation        HPI:  Mr. Guerrero is a 75 yo M with PMHx reportedly asthma who presents to the ED for evaluation of shortness of breath. He reports this has been going on for a "long time" but he was being stubborn and did not want to come. He then decided to be evaluated due to encouragement from others. He denies any fevers or chills, no recent sick contacts. He reports having a similar episode about 2 years ago. He has an albuterol inhaler that he uses as needed but no maintenance inhalers that he takes scheduled daily. He was given IV steroids, continuous nebulizers and supplemental oxygen in the Ed. He is now on nasal cannula and overall feeling better than on presentation. He has continued wheezing and oxygen requirements so he will be admitted to the hospital for further management.    Overview/Hospital Course:  No notes on file    Interval History: feeling better but still with coarse wheezing noted    Review of Systems  Objective:     Vital Signs (Most Recent):  Temp: 98.1 °F (36.7 °C) (07/05/24 1101)  Pulse: 108 (07/05/24 1101)  Resp: 18 (07/05/24 1101)  BP: 112/62 (07/05/24 1101)  SpO2: 97 % (07/05/24 1101) Vital Signs (24h Range):  Temp:  [97.7 °F (36.5 °C)-98.1 °F (36.7 °C)] 98.1 °F (36.7 °C)  Pulse:  [] 108  Resp:  [18-23] 18  SpO2:  [97 %-100 %] 97 %  BP: (107-157)/(61-75) 112/62     Weight: 103.1 kg (227 lb 4.7 oz)  Body mass index is 31.7 kg/m².    Intake/Output Summary (Last 24 hours) at 7/5/2024 1106  Last data filed at 7/5/2024 0409  Gross per 24 hour   Intake 200 ml   Output 490 ml   Net -290 ml         Physical Exam  Vitals and nursing note reviewed. "   Constitutional:       General: He is not in acute distress.     Appearance: Normal appearance. He is ill-appearing.   Cardiovascular:      Rate and Rhythm: Normal rate and regular rhythm.      Pulses: Normal pulses.   Pulmonary:      Effort: Pulmonary effort is normal.      Breath sounds: Wheezing present.   Abdominal:      General: Bowel sounds are normal. There is no distension.   Musculoskeletal:         General: No swelling.   Neurological:      General: No focal deficit present.      Mental Status: He is alert.   Psychiatric:         Mood and Affect: Mood normal.             Significant Labs: All pertinent labs within the past 24 hours have been reviewed.    Significant Imaging: I have reviewed all pertinent imaging results/findings within the past 24 hours.    Assessment/Plan:      * Mild intermittent asthma with acute exacerbation  - Continue with solumedrol 125 q6 per Pulmonology  - budesonide bid  - PRN albuterol nebulizers  - RIP pending  - azithromycin      Eosinophilia  Recheck as outpatient without exacerbation  - now normalized after high dose steroids      Acute bronchiolitis        Acute hypercapnic respiratory failure  Patient with Hypercapnic and Hypoxic Respiratory failure which is Acute.  he is not on home oxygen. Supplemental oxygen was provided and noted-      .   Signs/symptoms of respiratory failure include- tachypnea. Contributing diagnoses includes -  copd/asthma?   Labs and images were reviewed. Patient Has recent ABG, which has been reviewed. Will treat underlying causes and adjust management of respiratory failure as follows-   - treat underlying issues as above  - needs PFT as outpatient      VTE Risk Mitigation (From admission, onward)           Ordered     enoxaparin injection 40 mg  Every 24 hours         07/04/24 1625     IP VTE HIGH RISK PATIENT  Once         07/04/24 1448     Place sequential compression device  Until discontinued         07/04/24 1448                     Discharge Planning   RADHA:      Code Status: Full Code   Is the patient medically ready for discharge?:     Reason for patient still in hospital (select all that apply): Treatment  Discharge Plan A: Home with family                  David Best MD  Department of [facility] Medicine   [facility]

## 2024-07-05 NOTE — NURSING
[facility]  Nurses Note -- 4 Eyes      7/4/2024       Skin assessed on: Q Shift      [x] No Pressure Injuries Present    []Prevention Measures Documented    [] Yes LDA  for Pressure Injury Previously documented     [] Yes New Pressure Injury Discovered   [] LDA for New Pressure Injury Added      Attending RN:  Marina Amaya, RN     Second RN:  Timothy Kevin LPN        PER handoff given by Timothy BLAKE      Pt resting in bed quietly. NAD noted. No c/o pain.     Fall and safety precautions maintained. Bed alarm activated and audible.. Bed locked in lowest position, with side rails up x2. Call bell and personal items within reach.
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
## 2024-07-05 NOTE — NURSING
[facility]  Nurses Note -- 4 Eyes      7/5/2024       Skin assessed on: Q Shift      [x] No Pressure Injuries Present    []Prevention Measures Documented    [] Yes LDA  for Pressure Injury Previously documented     [] Yes New Pressure Injury Discovered   [] LDA for New Pressure Injury Added      Attending RN:  Timothy Kevin LPN     Second RN:  Marina RAJAN

## 2024-07-05 NOTE — PLAN OF CARE
Problem: Adult Inpatient Plan of Care  Goal: Plan of Care Review  Outcome: Progressing  Goal: Optimal Comfort and Wellbeing  Outcome: Progressing  Goal: Readiness for Transition of Care  Outcome: Progressing     Problem: COPD (Chronic Obstructive Pulmonary Disease)  Goal: Effective Oxygenation and Ventilation  Outcome: Progressing

## 2024-07-05 NOTE — ASSESSMENT & PLAN NOTE
Patient with Hypercapnic and Hypoxic Respiratory failure which is Acute.  he is not on home oxygen. Supplemental oxygen was provided and noted-      .   Signs/symptoms of respiratory failure include- tachypnea. Contributing diagnoses includes -  copd/asthma?   Labs and images were reviewed. Patient Has recent ABG, which has been reviewed. Will treat underlying causes and adjust management of respiratory failure as follows-   - treat underlying issues as above  - needs PFT as outpatient   Partial Purse String (Simple) Text: Given the location of the defect and the characteristics of the surrounding skin a simple purse string closure was deemed most appropriate.  Undermining was performed circumfirentially around the surgical defect.  A purse string suture was then placed and tightened. Wound tension only allowed a partial closure of the circular defect.
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
## 2024-07-06 VITALS
RESPIRATION RATE: 19 BRPM | HEIGHT: 71 IN | DIASTOLIC BLOOD PRESSURE: 66 MMHG | OXYGEN SATURATION: 98 % | BODY MASS INDEX: 31.82 KG/M2 | HEART RATE: 101 BPM | TEMPERATURE: 98 F | WEIGHT: 227.31 LBS | SYSTOLIC BLOOD PRESSURE: 110 MMHG

## 2024-07-06 LAB
ANION GAP SERPL CALC-SCNC: 10 MMOL/L (ref 8–16)
BASOPHILS # BLD AUTO: 0 K/UL (ref 0–0.2)
BASOPHILS NFR BLD: 0 % (ref 0–1.9)
BUN SERPL-MCNC: 18 MG/DL (ref 8–23)
CALCIUM SERPL-MCNC: 8.7 MG/DL (ref 8.7–10.5)
CHLORIDE SERPL-SCNC: 105 MMOL/L (ref 95–110)
CO2 SERPL-SCNC: 23 MMOL/L (ref 23–29)
CREAT SERPL-MCNC: 0.8 MG/DL (ref 0.5–1.4)
DIFFERENTIAL METHOD BLD: ABNORMAL
EOSINOPHIL # BLD AUTO: 0 K/UL (ref 0–0.5)
EOSINOPHIL NFR BLD: 0 % (ref 0–8)
ERYTHROCYTE [DISTWIDTH] IN BLOOD BY AUTOMATED COUNT: 13.2 % (ref 11.5–14.5)
EST. GFR  (NO RACE VARIABLE): >60 ML/MIN/1.73 M^2
GLUCOSE SERPL-MCNC: 99 MG/DL (ref 70–110)
HCT VFR BLD AUTO: 43.4 % (ref 40–54)
HGB BLD-MCNC: 13.3 G/DL (ref 14–18)
IMM GRANULOCYTES # BLD AUTO: 0.08 K/UL (ref 0–0.04)
IMM GRANULOCYTES NFR BLD AUTO: 0.5 % (ref 0–0.5)
LYMPHOCYTES # BLD AUTO: 1.5 K/UL (ref 1–4.8)
LYMPHOCYTES NFR BLD: 9.3 % (ref 18–48)
MAGNESIUM SERPL-MCNC: 2.3 MG/DL (ref 1.6–2.6)
MCH RBC QN AUTO: 28.6 PG (ref 27–31)
MCHC RBC AUTO-ENTMCNC: 30.6 G/DL (ref 32–36)
MCV RBC AUTO: 93 FL (ref 82–98)
MONOCYTES # BLD AUTO: 0.4 K/UL (ref 0.3–1)
MONOCYTES NFR BLD: 2.3 % (ref 4–15)
NEUTROPHILS # BLD AUTO: 13.8 K/UL (ref 1.8–7.7)
NEUTROPHILS NFR BLD: 87.9 % (ref 38–73)
NRBC BLD-RTO: 0 /100 WBC
PHOSPHATE SERPL-MCNC: 2.7 MG/DL (ref 2.7–4.5)
PLATELET # BLD AUTO: 203 K/UL (ref 150–450)
PMV BLD AUTO: 11 FL (ref 9.2–12.9)
POTASSIUM SERPL-SCNC: 4.9 MMOL/L (ref 3.5–5.1)
RBC # BLD AUTO: 4.65 M/UL (ref 4.6–6.2)
SODIUM SERPL-SCNC: 138 MMOL/L (ref 136–145)
WBC # BLD AUTO: 15.72 K/UL (ref 3.9–12.7)

## 2024-07-06 PROCEDURE — 83735 ASSAY OF MAGNESIUM: CPT | Performed by: STUDENT IN AN ORGANIZED HEALTH CARE EDUCATION/TRAINING PROGRAM

## 2024-07-06 PROCEDURE — 63600175 PHARM REV CODE 636 W HCPCS: Performed by: INTERNAL MEDICINE

## 2024-07-06 PROCEDURE — 94761 N-INVAS EAR/PLS OXIMETRY MLT: CPT

## 2024-07-06 PROCEDURE — 27000221 HC OXYGEN, UP TO 24 HOURS

## 2024-07-06 PROCEDURE — 85025 COMPLETE CBC W/AUTO DIFF WBC: CPT | Performed by: STUDENT IN AN ORGANIZED HEALTH CARE EDUCATION/TRAINING PROGRAM

## 2024-07-06 PROCEDURE — 63700000 PHARM REV CODE 250 ALT 637 W/O HCPCS: Performed by: INTERNAL MEDICINE

## 2024-07-06 PROCEDURE — 25000242 PHARM REV CODE 250 ALT 637 W/ HCPCS: Performed by: INTERNAL MEDICINE

## 2024-07-06 PROCEDURE — 99232 SBSQ HOSP IP/OBS MODERATE 35: CPT | Mod: ,,, | Performed by: INTERNAL MEDICINE

## 2024-07-06 PROCEDURE — 84100 ASSAY OF PHOSPHORUS: CPT | Performed by: STUDENT IN AN ORGANIZED HEALTH CARE EDUCATION/TRAINING PROGRAM

## 2024-07-06 PROCEDURE — 80048 BASIC METABOLIC PNL TOTAL CA: CPT | Performed by: STUDENT IN AN ORGANIZED HEALTH CARE EDUCATION/TRAINING PROGRAM

## 2024-07-06 PROCEDURE — 94640 AIRWAY INHALATION TREATMENT: CPT

## 2024-07-06 PROCEDURE — 36415 COLL VENOUS BLD VENIPUNCTURE: CPT | Performed by: STUDENT IN AN ORGANIZED HEALTH CARE EDUCATION/TRAINING PROGRAM

## 2024-07-06 RX ORDER — FLUTICASONE FUROATE 200 UG/1
1 POWDER RESPIRATORY (INHALATION) DAILY
Qty: 30 EACH | Refills: 3 | Status: SHIPPED | OUTPATIENT
Start: 2024-07-06

## 2024-07-06 RX ORDER — PREDNISONE 10 MG/1
TABLET ORAL
Qty: 63 TABLET | Refills: 0 | Status: SHIPPED | OUTPATIENT
Start: 2024-07-06 | End: 2024-07-24

## 2024-07-06 RX ORDER — AZITHROMYCIN 500 MG/1
500 TABLET, FILM COATED ORAL DAILY
Qty: 3 TABLET | Refills: 0 | Status: SHIPPED | OUTPATIENT
Start: 2024-07-07 | End: 2024-07-10

## 2024-07-06 RX ORDER — PREDNISONE 10 MG/1
TABLET ORAL
Qty: 18 TABLET | Refills: 0 | Status: SHIPPED | OUTPATIENT
Start: 2024-07-06 | End: 2024-07-06

## 2024-07-06 RX ADMIN — AZITHROMYCIN DIHYDRATE 500 MG: 250 TABLET ORAL at 08:07

## 2024-07-06 RX ADMIN — METHYLPREDNISOLONE SODIUM SUCCINATE 60 MG: 40 INJECTION, POWDER, FOR SOLUTION INTRAMUSCULAR; INTRAVENOUS at 05:07

## 2024-07-06 RX ADMIN — BUDESONIDE 0.25 MG: 0.25 INHALANT RESPIRATORY (INHALATION) at 07:07

## 2024-07-06 RX ADMIN — ALBUTEROL SULFATE 2.5 MG: 2.5 SOLUTION RESPIRATORY (INHALATION) at 07:07

## 2024-07-06 RX ADMIN — METHYLPREDNISOLONE SODIUM SUCCINATE 60 MG: 40 INJECTION, POWDER, FOR SOLUTION INTRAMUSCULAR; INTRAVENOUS at 11:07

## 2024-07-06 NOTE — PROGRESS NOTES
AVS virtually reviewed with patient in its entirety with emphasis on diet, medications, follow-up appointments and reasons to return to the ED or contact the [facility] On [facility] Nurse [facility] Line. Patient also encouraged to utilize their patient portal. Ease and convenience of use reiterated. Education complete and patient voiced understanding. All questions answered. Discharge teaching complete.

## 2024-07-06 NOTE — NURSING
[facility]  Nurses Note -- 4 Eyes      7/5/2024       Skin assessed on: Q Shift      [x] No Pressure Injuries Present    []Prevention Measures Documented    [] Yes LDA  for Pressure Injury Previously documented     [] Yes New Pressure Injury Discovered   [] LDA for New Pressure Injury Added      Attending RN:  Marina Amaya, RN     Second RN:  Timothy Storm LPN        PER handoff given by Timothy BLAKE      Pt resting in bed quietly. NAD noted. No c/o pain.     Fall and safety precautions maintained. Bed alarm activated and audible.. Bed locked in lowest position, with side rails up x2. Call bell and personal items within reach

## 2024-07-06 NOTE — HOSPITAL COURSE
Mr. Guerrero is a 75 yo M with hx of asthma on albuterol who was admitted with acute respiratory failure (hypercapnia/hypoxia) and significant wheezing. Pulmonology consulted and patient placed on high dose IV steroids, budesonide and azithromycin. He continued with wheezing and slowly improved until day of discharge. He was ambulating with no oxygen requirements and overall feeling much better. Pulmonology recommending prednisone taper at discharge, complete total of 5 days azithromycin and arnuity inhaler. All prescriptions sent to pharmacy and discussed with patient. He will be discharged home. Referral placed for Pulmonology follow up with Dr. Salter. Patient discharged home in stable condition.

## 2024-07-06 NOTE — PLAN OF CARE
Case Management Final Discharge Note    Discharge Disposition: Home    New DME ordered / company name: None    Relevant SDOH / Transition of Care Barriers:  None    Primary Caretaker and contact information: Mylene thomas [phone]    [facility]: [facility]    Referrals placed: Pulmonology rehab and pulmonology    Transportation: Pt's spouse will provide transportation home when discharged.    Patient and family educated on discharge services and updated on DC plan. Bedside RN notified, patient clear to discharge from Case Management Perspective.       07/06/24 1238   Final Note   Assessment Type Final Discharge Note   Anticipated Discharge Disposition Home   What phone number can be called within the next 1-3 days to see how you are doing after discharge? [phone]   Hospital Resources/Appts/Education Provided Appointments scheduled and added to AVS   Post-Acute Status   Coverage Medicare A only, [facility]/[employer]   Discharge Delays None known at this time

## 2024-07-06 NOTE — SUBJECTIVE & OBJECTIVE
Interval History: Patient feels significantly better again today and comfortable walking around the room. He is hoping to go home today. No new complaints.    Discussed case in detail with hospitalist.     Objective:     Vital Signs (Most Recent):  Temp: 98.3 °F (36.8 °C) (07/06/24 1105)  Pulse: 101 (07/06/24 1105)  Resp: 19 (07/06/24 1105)  BP: 110/66 (07/06/24 1105)  SpO2: 98 % (07/06/24 1105) Vital Signs (24h Range):  Temp:  [98 °F (36.7 °C)-99 °F (37.2 °C)] 98.3 °F (36.8 °C)  Pulse:  [] 101  Resp:  [16-19] 19  SpO2:  [94 %-98 %] 98 %  BP: (110-136)/(57-85) 110/66     Weight: 103.1 kg (227 lb 4.7 oz)  Body mass index is 31.7 kg/m².      Intake/Output Summary (Last 24 hours) at 7/6/2024 1202  Last data filed at 7/6/2024 0449  Gross per 24 hour   Intake --   Output 500 ml   Net -500 ml        Physical Exam  Vitals reviewed.   Constitutional:       General: He is in acute distress.      Appearance: He is normal weight. He is ill-appearing.   HENT:      Head: Normocephalic and atraumatic.   Eyes:      Extraocular Movements: Extraocular movements intact.      Pupils: Pupils are equal, round, and reactive to light.   Cardiovascular:      Rate and Rhythm: Regular rhythm. Tachycardia present.      Pulses: Normal pulses.      Heart sounds: Normal heart sounds.   Pulmonary:      Effort: No respiratory distress.      Comments:   Mild now mostly exp wheezing with improved air movement  Multiple chest wall keloids.   Abdominal:      General: Abdomen is flat.      Palpations: Abdomen is soft.   Musculoskeletal:      Right lower leg: No edema.      Left lower leg: No edema.   Skin:     General: Skin is warm and dry.      Capillary Refill: Capillary refill takes less than 2 seconds.   Neurological:      General: No focal deficit present.      Mental Status: He is alert.   Psychiatric:         Mood and Affect: Mood normal.         Behavior: Behavior normal.           Review of Systems    Vents:       Lines/Drains/Airways        Peripheral Intravenous Line  Duration                  Peripheral IV - Single Lumen 07/04/24 1032 20 G Anterior;Left Forearm 2 days         Peripheral IV - Single Lumen 07/04/24 1045 20 G Right Antecubital 2 days                    Significant Labs:    CBC/Anemia Profile:  Recent Labs   Lab 07/05/24  0440 07/06/24  0436   WBC 8.24 15.72*   HGB 13.5* 13.3*   HCT 43.4 43.4    203   MCV 91 93   RDW 13.1 13.2        Chemistries:  Recent Labs   Lab 07/05/24  0440 07/06/24  0436    138   K 4.8 4.9    105   CO2 26 23   BUN 16 18   CREATININE 0.9 0.8   CALCIUM 9.2 8.7   MG 2.4 2.3   PHOS 3.0 2.7       All pertinent labs within the past 24 hours have been reviewed.    Significant Imaging:  I have reviewed all pertinent imaging results/findings within the past 24 hours.

## 2024-07-06 NOTE — ASSESSMENT & PLAN NOTE
Patient with Hypercapnic Respiratory failure which is Acute.  he is not on home oxygen. Supplemental oxygen was provided and noted-      .   Signs/symptoms of respiratory failure include- tachypnea, increased work of breathing, and respiratory distress. Contributing diagnoses includes -  Asthma, Bronchiolitis  Labs and images were reviewed. Patient Has recent ABG, which has been reviewed. Improved with steroids.

## 2024-07-06 NOTE — PROGRESS NOTES
[facility]  [facility]mon[facility]  Progress Note    Patient Name: Kai Guerrero  MRN: [id number]  Admission Date: 7/4/2024  Hospital Length of Stay: 2 days  Code Status: Full Code  Attending Provider: David Best MD  Primary Care Provider: Kristen, Primary Doctor   Principal Problem: Mild intermittent asthma with acute exacerbation    Subjective:     Interval History: Patient feels significantly better again today and comfortable walking around the room. He is hoping to go home today. No new complaints.    Discussed case in detail with hospitalist.     Objective:     Vital Signs (Most Recent):  Temp: 98.3 °F (36.8 °C) (07/06/24 1105)  Pulse: 101 (07/06/24 1105)  Resp: 19 (07/06/24 1105)  BP: 110/66 (07/06/24 1105)  SpO2: 98 % (07/06/24 1105) Vital Signs (24h Range):  Temp:  [98 °F (36.7 °C)-99 °F (37.2 °C)] 98.3 °F (36.8 °C)  Pulse:  [] 101  Resp:  [16-19] 19  SpO2:  [94 %-98 %] 98 %  BP: (110-136)/(57-85) 110/66     Weight: 103.1 kg (227 lb 4.7 oz)  Body mass index is 31.7 kg/m².      Intake/Output Summary (Last 24 hours) at 7/6/2024 1202  Last data filed at 7/6/2024 0449  Gross per 24 hour   Intake --   Output 500 ml   Net -500 ml        Physical Exam  Vitals reviewed.   Constitutional:       General: He is in acute distress.      Appearance: He is normal weight. He is ill-appearing.   HENT:      Head: Normocephalic and atraumatic.   Eyes:      Extraocular Movements: Extraocular movements intact.      Pupils: Pupils are equal, round, and reactive to light.   Cardiovascular:      Rate and Rhythm: Regular rhythm. Tachycardia present.      Pulses: Normal pulses.      Heart sounds: Normal heart sounds.   Pulmonary:      Effort: No respiratory distress.      Comments:   Mild now mostly exp wheezing with improved air movement  Multiple chest wall keloids.   Abdominal:      General: Abdomen is flat.      Palpations: Abdomen is soft.   Musculoskeletal:      Right lower leg: No edema.      Left lower leg: No edema.    Skin:     General: Skin is warm and dry.      Capillary Refill: Capillary refill takes less than 2 seconds.   Neurological:      General: No focal deficit present.      Mental Status: He is alert.   Psychiatric:         Mood and Affect: Mood normal.         Behavior: Behavior normal.           Review of Systems    Vents:       Lines/Drains/Airways       Peripheral Intravenous Line  Duration                  Peripheral IV - Single Lumen 07/04/24 1032 20 G Anterior;Left Forearm 2 days         Peripheral IV - Single Lumen 07/04/24 1045 20 G Right Antecubital 2 days                    Significant Labs:    CBC/Anemia Profile:  Recent Labs   Lab 07/05/24  0440 07/06/24  0436   WBC 8.24 15.72*   HGB 13.5* 13.3*   HCT 43.4 43.4    203   MCV 91 93   RDW 13.1 13.2        Chemistries:  Recent Labs   Lab 07/05/24  0440 07/06/24  0436    138   K 4.8 4.9    105   CO2 26 23   BUN 16 18   CREATININE 0.9 0.8   CALCIUM 9.2 8.7   MG 2.4 2.3   PHOS 3.0 2.7       All pertinent labs within the past 24 hours have been reviewed.    Significant Imaging:  I have reviewed all pertinent imaging results/findings within the past 24 hours.    ABG  Recent Labs   Lab 07/04/24  1153   PH 7.259*   PO2 195*   PCO2 60.6*   HCO3 27.2   BE -2     Assessment/Plan:     Pulmonary  * Mild intermittent asthma with acute exacerbation    Once outside of acute exacerbation will need to better clarify baseline symptoms.  Will need baseline PFTs as outpatient.  Based on infectious workup, will determine outpatient inhaler maintenance for patient.    -Given PCR negative, could have been an environmental exposure    -Plan for 6 weeks of ICS inhaler on discharge  -outpatient follow up to assess need for long term inhalers.    Acute bronchiolitis  Budesonide Nebs BID.  Azithromycin 500 daily  Resp PCR  Solumedrol 125 q6 x 24 hours, then 60q6  -Pred 60 and drop by 10mg every 3 days.  -Arnuity 200    Acute hypercapnic respiratory failure  Patient  with Hypercapnic Respiratory failure which is Acute.  he is not on home oxygen. Supplemental oxygen was provided and noted-      .   Signs/symptoms of respiratory failure include- tachypnea, increased work of breathing, and respiratory distress. Contributing diagnoses includes -  Asthma, Bronchiolitis  Labs and images were reviewed. Patient Has recent ABG, which has been reviewed. Improved with steroids.     Oncology  Eosinophilia  Possibly asthma related. ? Exposure.  Needs to be repeated outpatient outside of acute exacerbation.           Jewel Street MD  Pulmonology  [facility] - Telemetry

## 2024-07-06 NOTE — DISCHARGE INSTRUCTIONS
PREDNISONE    July 7 & 8 - Take 6 tablets in the morning  July 9, 10, 11 - Take 5 tablets in the morning  July 12, 13, 14 - Take 4 tablets in the morning  July 15, 16, 17 - Take 3 tablets in the morning  July 18, 19, 20 - Take 2 tablets in the morning  July 21, 22, 23 - Take 1 tablet in the morning

## 2024-07-06 NOTE — PLAN OF CARE
Problem: Adult Inpatient Plan of Care  Goal: Plan of Care Review  Outcome: Adequate for Care Transition  Goal: Patient-Specific Goal (Individualized)  Outcome: Adequate for Care Transition  Goal: Absence of Hospital-Acquired Illness or Injury  Outcome: Adequate for Care Transition  Goal: Optimal Comfort and Wellbeing  Outcome: Adequate for Care Transition  Goal: Readiness for Transition of Care  Outcome: Adequate for Care Transition     Problem: COPD (Chronic Obstructive Pulmonary Disease)  Goal: Optimal Chronic Illness Coping  Outcome: Adequate for Care Transition  Goal: Optimal Level of Functional [place]  Outcome: Adequate for Care Transition  Goal: Absence of Infection Signs and Symptoms  Outcome: Adequate for Care Transition  Goal: Improved Oral Intake  Outcome: Adequate for Care Transition  Goal: Effective Oxygenation and Ventilation  Outcome: Adequate for Care Transition

## 2024-07-06 NOTE — NURSING
[facility] [place]  Nurses Note -- 4 Eyes      7/6/2024       Skin assessed on: Q Shift      [x] No Pressure Injuries Present    [x]Prevention Measures Documented    [] Yes LDA  for Pressure Injury Previously documented     [] Yes New Pressure Injury Discovered   [] LDA for New Pressure Injury Added      Attending RN:  Kelsey Hyde RN     Second RN:  Mraina Amaya RN

## 2024-07-06 NOTE — DISCHARGE SUMMARY
"[facility] Medicine  Discharge Summary      Patient Name: Kai Guerrero  MRN: [id number]  [facility]: [id number]  Patient Class: IP- Inpatient  Admission Date: 7/4/2024  Hospital Length of Stay: 2 days  Discharge Date and Time: 7/6/2024  3:21 PM  Attending Physician: Kristen att. providers found   Discharging Provider: David Best MD  Primary Care Provider: Kristen, Primary Doctor    Primary Care Team: Networked reference to record PCT     HPI:   Mr. Guerrero is a 73 yo M with PMHx reportedly asthma who presents to the ED for evaluation of shortness of breath. He reports this has been going on for a "long time" but he was being stubborn and did not want to come. He then decided to be evaluated due to encouragement from others. He denies any fevers or chills, no recent sick contacts. He reports having a similar episode about 2 years ago. He has an albuterol inhaler that he uses as needed but no maintenance inhalers that he takes scheduled daily. He was given IV steroids, continuous nebulizers and supplemental oxygen in the Ed. He is now on nasal cannula and overall feeling better than on presentation. He has continued wheezing and oxygen requirements so he will be admitted to the hospital for further management.    * No surgery found *      Hospital Course:   Mr. Guerrero is a 73 yo M with hx of asthma on albuterol who was admitted with acute respiratory failure (hypercapnia/hypoxia) and significant wheezing. Pulmonology consulted and patient placed on high dose IV steroids, budesonide and azithromycin. He continued with wheezing and slowly improved until day of discharge. He was ambulating with no oxygen requirements and overall feeling much better. Pulmonology recommending prednisone taper at discharge, complete total of 5 days azithromycin and arnuity inhaler. All prescriptions sent to pharmacy and discussed with patient. He will be discharged home. Referral placed for Pulmonology follow up with Dr. Salter. Patient " discharged home in stable condition.     Goals of Care Treatment Preferences:  Code Status: Full Code      Consults:   Consults (From admission, onward)          Status Ordering Provider     Inpatient consult to Critical Care Medicine  Once        Provider:  Mann Jimenez MD    Completed MANN JIMENEZ     Inpatient consult to Pulmonology  Once        Provider:  Mann Jimenez MD    Completed MANN JIMENEZ            No new Assessment & Plan notes have been filed under this hospital service since the last note was generated.  Service: Hospital Medicine    Final Active Diagnoses:    Diagnosis Date Noted POA    PRINCIPAL PROBLEM:  Mild intermittent asthma with acute exacerbation [J45.21] 07/04/2024 Yes    Acute hypercapnic respiratory failure [J96.02] 07/04/2024 Yes    Acute bronchiolitis [J21.9] 07/04/2024 Yes    Eosinophilia [D72.10] 07/04/2024 Yes      Problems Resolved During this Admission:       Discharged Condition: stable    Disposition: Home or Self Care    Follow Up:   Follow-up Information       [facility].    Why: To schedule appointments with pulmonology, pulmonolgy rehab and pcp within 7 days.  Contact information:  [address] [facility] [address]  [phone]                           Patient Instructions:      Ambulatory referral/consult to Pulmonology   Standing Status: Future   Referral Priority: Routine Referral Type: Consultation   Referral Reason: Specialty Services Required   Referred to Provider: ROSALBA FISHER Requested Specialty: Pulmonary Disease   Number of Visits Requested: 1     Ambulatory referral/consult to Pulmonary Rehab   Standing Status: Future   Referral Priority: Routine Referral Type: Consultation   Referral Reason: Specialty Services Required   Requested Specialty: Pulmonary Disease   Number of Visits Requested: 1     Diet Adult Regular     Notify your health care provider if you experience any of the  following:  temperature >100.4     Notify your health care provider if you experience any of the following:  persistent nausea and vomiting or diarrhea     Notify your health care provider if you experience any of the following:  severe uncontrolled pain     Notify your health care provider if you experience any of the following:  difficulty breathing or increased cough     Notify your health care provider if you experience any of the following:  severe persistent headache     Notify your health care provider if you experience any of the following:  worsening rash     Notify your health care provider if you experience any of the following:  persistent dizziness, light-headedness, or visual disturbances     Notify your health care provider if you experience any of the following:  increased confusion or weakness     Activity as tolerated       Significant Diagnostic Studies: Labs: All labs within the past 24 hours have been reviewed    Pending Diagnostic Studies:       None           Medications:  Reconciled Home Medications:      Medication List        START taking these medications      ARNUITY ELLIPTA 200 mcg/actuation inhaler  Generic drug: fluticasone furoate  Inhale 1 puff into the lungs once daily. Controller     azithromycin 500 MG tablet  Commonly known as: ZITHROMAX  Take 1 tablet (500 mg total) by mouth once daily. for 3 days  Start taking on: July 7, 2024     predniSONE 10 MG tablet  Commonly known as: DELTASONE  Take 6 tablets (60 mg total) by mouth once daily for 3 days, THEN 5 tablets (50 mg total) once daily for 3 days, THEN 4 tablets (40 mg total) once daily for 3 days, THEN 3 tablets (30 mg total) once daily for 3 days, THEN 2 tablets (20 mg total) once daily for 3 days, THEN 1 tablet (10 mg total) once daily for 3 days.  Start taking on: July 6, 2024              Indwelling Lines/Drains at time of discharge:   Lines/Drains/Airways       None                   Time spent on the discharge of patient:  >35 minutes         David Best MD  Department of Hospital Medicine  [facility] [place]

## 2024-07-06 NOTE — ASSESSMENT & PLAN NOTE
Once outside of acute exacerbation will need to better clarify baseline symptoms.  Will need baseline PFTs as outpatient.  Based on infectious workup, will determine outpatient inhaler maintenance for patient.    -Given PCR negative, could have been an environmental exposure    -Plan for 6 weeks of ICS inhaler on discharge  -outpatient follow up to assess need for long term inhalers.

## 2024-07-06 NOTE — ASSESSMENT & PLAN NOTE
Budesonide Nebs BID.  Azithromycin 500 daily  Resp PCR  Solumedrol 125 q6 x 24 hours, then 60q6  -Pred 60 and drop by 10mg every 3 days.  -Arnuity 200

## 2024-07-08 DIAGNOSIS — J45.909 ASTHMA, UNSPECIFIED ASTHMA SEVERITY, UNSPECIFIED WHETHER COMPLICATED, UNSPECIFIED WHETHER PERSISTENT: Primary | ICD-10-CM

## 2024-08-12 ENCOUNTER — HOSPITAL ENCOUNTER (OUTPATIENT)
Dept: PULMONOLOGY | Facility: HOSPITAL | Age: 74
Discharge: HOME | End: 2024-08-12
Attending: INTERNAL MEDICINE
Payer: OTHER GOVERNMENT

## 2024-08-12 ENCOUNTER — OFFICE VISIT (OUTPATIENT)
Dept: PULMONOLOGY | Facility: CLINIC | Age: 74
End: 2024-08-12
Payer: OTHER GOVERNMENT

## 2024-08-12 VITALS
OXYGEN SATURATION: 96 % | SYSTOLIC BLOOD PRESSURE: 106 MMHG | HEIGHT: 71 IN | DIASTOLIC BLOOD PRESSURE: 68 MMHG | BODY MASS INDEX: 32.62 KG/M2 | HEART RATE: 80 BPM | WEIGHT: 233 LBS

## 2024-08-12 DIAGNOSIS — J21.9 ACUTE BRONCHIOLITIS DUE TO UNSPECIFIED ORGANISM: Primary | ICD-10-CM

## 2024-08-12 DIAGNOSIS — J45.909 ASTHMA, UNSPECIFIED ASTHMA SEVERITY, UNSPECIFIED WHETHER COMPLICATED, UNSPECIFIED WHETHER PERSISTENT: ICD-10-CM

## 2024-08-12 DIAGNOSIS — D72.10 EOSINOPHILIA, UNSPECIFIED TYPE: ICD-10-CM

## 2024-08-12 DIAGNOSIS — J96.02 ACUTE HYPERCAPNIC RESPIRATORY FAILURE: ICD-10-CM

## 2024-08-12 DIAGNOSIS — J45.40 MODERATE PERSISTENT ASTHMA WITHOUT COMPLICATION: ICD-10-CM

## 2024-08-12 PROCEDURE — 99999 PR PBB SHADOW E&M-EST. PATIENT-LVL III: CPT | Mod: PBBFAC,,, | Performed by: INTERNAL MEDICINE

## 2024-08-12 PROCEDURE — 99214 OFFICE O/P EST MOD 30 MIN: CPT | Mod: 25,S$GLB,, | Performed by: INTERNAL MEDICINE

## 2024-08-12 RX ORDER — FUROSEMIDE 20 MG/1
20 TABLET ORAL DAILY PRN
Qty: 30 TABLET | Refills: 0 | Status: SHIPPED | OUTPATIENT
Start: 2024-08-12 | End: 2025-08-12

## 2024-08-12 RX ORDER — FLUTICASONE FUROATE 200 UG/1
1 POWDER RESPIRATORY (INHALATION) DAILY
Qty: 60 EACH | Refills: 11 | Status: SHIPPED | OUTPATIENT
Start: 2024-08-12

## 2024-08-12 RX ORDER — ALBUTEROL SULFATE 90 UG/1
2 INHALANT RESPIRATORY (INHALATION) EVERY 4 HOURS PRN
Qty: 18 G | Refills: 11 | Status: SHIPPED | OUTPATIENT
Start: 2024-08-12 | End: 2025-08-12

## 2024-08-12 NOTE — ASSESSMENT & PLAN NOTE
Recovered from acute severe flair. Discussed using maintenance asthma medications to minimize risks of recurrence.   Still having PFTs changes with restriction and BD response. Repeat PFTs in 6 months

## 2024-08-12 NOTE — ASSESSMENT & PLAN NOTE
Patient with Hypercapnic Respiratory failure which is Acute.  he is not on home oxygen.      .   Signs/symptoms of respiratory failure include- tachypnea, increased work of breathing, and respiratory distress. Contributing diagnoses includes -  Asthma, Bronchiolitis  Labs and images were reviewed. Patient Has recent ABG, which has been reviewed. Improved with steroids.     -anticipate will have full recovery. Repeat ABG prior to elective surgery.

## 2024-08-12 NOTE — ASSESSMENT & PLAN NOTE
Significant chronic condition to be followed longitudinally with following long term treatment plan.      Symptoms greatly improved but with severe flair and PFT changes, will maintain on daily ICS long term.  PRN albuterol. Went over inhaler tachnique.

## 2024-08-14 LAB
DLCO ADJ PRE: 22.68 ML/(MIN*MMHG) (ref 20.11–33.97)
DLCO SINGLE BREATH LLN: 20.11
DLCO SINGLE BREATH PRE REF: 80.6 %
DLCO SINGLE BREATH REF: 27.04
DLCOC SBVA LLN: 2.58
DLCOC SBVA PRE REF: 146.9 %
DLCOC SBVA REF: 3.69
DLCOC SINGLE BREATH LLN: 20.11
DLCOC SINGLE BREATH PRE REF: 83.9 %
DLCOC SINGLE BREATH REF: 27.04
DLCOCSBVAULN: 4.8
DLCOCSINGLEBREATHULN: 33.97
DLCOCSINGLEBREATHZSCORE: -1.04
DLCOSINGLEBREATHULN: 33.97
DLCOSINGLEBREATHZSCORE: -1.24
DLCOVA LLN: 2.58
DLCOVA PRE REF: 141.2 %
DLCOVA PRE: 5.21 ML/(MIN*MMHG*L) (ref 2.58–4.8)
DLCOVA REF: 3.69
DLCOVAULN: 4.8
DLVAADJ PRE: 5.42 ML/(MIN*MMHG*L) (ref 2.58–4.8)
ERV LLN: -16448.98
ERV PRE REF: 51.4 %
ERV REF: 1.02
ERVULN: ABNORMAL
FEF 25 75 LLN: 1.26
FEF 25 75 PRE REF: 31.3 %
FEF 25 75 REF: 2.97
FET100 CHG: 7.7 %
FEV05 LLN: 1.52
FEV05 REF: 2.65
FEV1 CHG: 15.4 %
FEV1 FVC LLN: 62
FEV1 FVC PRE REF: 87.2 %
FEV1 FVC REF: 76
FEV1 LLN: 1.78
FEV1 PRE REF: 67.7 %
FEV1 REF: 2.66
FEV1FVCZSCORE: -1.19
FEV1ZSCORE: -1.61
FRCPLETH LLN: 2.82
FRCPLETH PREREF: 74.8 %
FRCPLETH REF: 3.8
FRCPLETHULN: 4.79
FVC CHG: 3.5 %
FVC LLN: 2.51
FVC PRE REF: 77.2 %
FVC REF: 3.54
FVCZSCORE: -1.28
IVC PRE: 2.84 L (ref 2.51–4.59)
IVC SINGLE BREATH LLN: 2.51
IVC SINGLE BREATH PRE REF: 80.2 %
IVC SINGLE BREATH REF: 3.54
IVCSINGLEBREATHULN: 4.59
LLN IC: -9999996.77
PEF LLN: 5.32
PEF PRE REF: 72.1 %
PEF REF: 8.05
PHYSICIAN COMMENT: ABNORMAL
POST FEF 25 75: 1.43 L/S (ref 1.26–4.68)
POST FET 100: 9.4 SEC
POST FEV1 FVC: 73.49 % (ref 61.99–87.93)
POST FEV1: 2.08 L (ref 1.78–3.47)
POST FEV5: 1.64 L (ref 1.52–3.79)
POST FVC: 2.83 L (ref 2.51–4.59)
POST PEF: 8.4 L/S (ref 5.32–10.78)
PRE DLCO: 21.8 ML/(MIN*MMHG) (ref 20.11–33.97)
PRE ERV: 0.53 L (ref -16448.98–16451.02)
PRE FEF 25 75: 0.93 L/S (ref 1.26–4.68)
PRE FET 100: 8.73 SEC
PRE FEV05 REF: 50.4 %
PRE FEV1 FVC: 65.95 % (ref 61.99–87.93)
PRE FEV1: 1.8 L (ref 1.78–3.47)
PRE FEV5: 1.34 L (ref 1.52–3.79)
PRE FRC PL: 2.85 L (ref 2.82–4.79)
PRE FVC: 2.73 L (ref 2.51–4.59)
PRE IC: 2.31 L (ref -9999996.77–#######.####)
PRE PEF: 5.81 L/S (ref 5.32–10.78)
PRE REF IC: 71.7 %
PRE RV: 2.32 L (ref 2.11–3.46)
PRE TLC: 5.16 L (ref 6.18–8.48)
RAW PRE REF: 119.9 %
RAW PRE: 3.67 CMH2O*S/L (ref 3.06–3.06)
RAW REF: 3.06
REF IC: 3.23
RV LLN: 2.11
RV PRE REF: 83.4 %
RV REF: 2.78
RVTLC LLN: 34
RVTLC PRE REF: 104 %
RVTLC PRE: 44.95 % (ref 34.23–52.19)
RVTLC REF: 43
RVTLCULN: 52
RVULN: 3.46
SGAW PRE REF: 85.3 %
SGAW PRE: 0.07 1/(CMH2O*S) (ref 0.08–0.08)
SGAW REF: 0.08
TLC LLN: 6.18
TLC PRE REF: 70.4 %
TLC REF: 7.33
TLC ULN: 8.48
TLCZSCORE: -3.1
ULN IC: ABNORMAL
VA PRE: 4.19 L (ref 7.18–7.18)
VA SINGLE BREATH LLN: 7.18
VA SINGLE BREATH PRE REF: 58.3 %
VA SINGLE BREATH REF: 7.18
VASINGLEBREATHULN: 7.18
VC LLN: 2.51
VC PRE REF: 80.2 %
VC PRE: 2.84 L (ref 2.51–4.59)
VC REF: 3.54
VC ULN: 4.59

## 2024-08-14 PROCEDURE — 94060 EVALUATION OF WHEEZING: CPT | Mod: 26,S$GLB,, | Performed by: INTERNAL MEDICINE

## 2024-08-14 PROCEDURE — 94729 DIFFUSING CAPACITY: CPT | Mod: 26,S$GLB,, | Performed by: INTERNAL MEDICINE

## 2024-08-14 PROCEDURE — 94726 PLETHYSMOGRAPHY LUNG VOLUMES: CPT | Mod: 26,S$GLB,, | Performed by: INTERNAL MEDICINE

## 2024-09-10 RX ORDER — FUROSEMIDE 20 MG/1
20 TABLET ORAL DAILY PRN
Qty: 30 TABLET | Refills: 0 | Status: SHIPPED | OUTPATIENT
Start: 2024-09-10 | End: 2025-09-10

## 2024-10-07 PROBLEM — J96.02 ACUTE HYPERCAPNIC RESPIRATORY FAILURE: Status: RESOLVED | Noted: 2024-07-04 | Resolved: 2024-10-07

## 2024-10-15 RX ORDER — FUROSEMIDE 20 MG/1
TABLET ORAL
Qty: 30 TABLET | Refills: 0 | Status: SHIPPED | OUTPATIENT
Start: 2024-10-15

## 2024-10-17 ENCOUNTER — PATIENT MESSAGE (OUTPATIENT)
Dept: RESEARCH | Facility: HOSPITAL | Age: 74
End: 2024-10-17
Payer: OTHER GOVERNMENT

## 2024-10-23 RX ORDER — FUROSEMIDE 20 MG/1
20 TABLET ORAL DAILY
Qty: 30 TABLET | Refills: 0 | Status: SHIPPED | OUTPATIENT
Start: 2024-10-23

## 2024-10-24 ENCOUNTER — TELEPHONE (OUTPATIENT)
Dept: PULMONOLOGY | Facility: CLINIC | Age: 74
End: 2024-10-24
Payer: OTHER GOVERNMENT

## 2024-10-24 NOTE — TELEPHONE ENCOUNTER
----- Message from Jewel Street MD sent at 10/24/2024  3:34 PM CDT -----  Regarding: RE: Consult/Advisory  Contact: [phone]  That is an equivalent inhaler and fine to use if it ends up cheaper for him.  ----- Message -----  From: Sendy Byrne MA  Sent: 10/24/2024  11:30 AM CDT  To: Jewel Street MD  Subject: FW: Consult/Advisory                             Can he use this inhaler?  ----- Message -----  From: Anton Terry  Sent: 10/24/2024  11:30 AM CDT  To: Yenifer Holloway Staff  Subject: Consult/Advisory                                 Consult/Advisory     Name Of Caller: Pt         Contact Preference:  [phone] Or [phone]     Nature of call: Would like to know [facility] would [facility] the same as Rxfluticasone furoate (ARNUITY ELLIPTA) 200 mcg/actuation inhaler  [facility] - Asmanex twist inhaler 220 mcg/ actuation inhaler. Please call to advise Thank you

## 2024-11-01 ENCOUNTER — OFFICE VISIT (OUTPATIENT)
Dept: PULMONOLOGY | Facility: CLINIC | Age: 74
End: 2024-11-01
Payer: OTHER GOVERNMENT

## 2024-11-01 VITALS
OXYGEN SATURATION: 97 % | HEIGHT: 71 IN | WEIGHT: 243.5 LBS | DIASTOLIC BLOOD PRESSURE: 80 MMHG | SYSTOLIC BLOOD PRESSURE: 126 MMHG | HEART RATE: 91 BPM | BODY MASS INDEX: 34.09 KG/M2

## 2024-11-01 DIAGNOSIS — D72.10 EOSINOPHILIA, UNSPECIFIED TYPE: ICD-10-CM

## 2024-11-01 DIAGNOSIS — J45.40 MODERATE PERSISTENT ASTHMA WITHOUT COMPLICATION: Primary | ICD-10-CM

## 2024-11-01 DIAGNOSIS — J45.21 MILD INTERMITTENT ASTHMA WITH ACUTE EXACERBATION: ICD-10-CM

## 2024-11-01 DIAGNOSIS — G47.33 OSA (OBSTRUCTIVE SLEEP APNEA): ICD-10-CM

## 2024-11-01 PROCEDURE — 99999 PR PBB SHADOW E&M-EST. PATIENT-LVL III: CPT | Mod: PBBFAC,,, | Performed by: INTERNAL MEDICINE

## 2024-11-01 PROCEDURE — 99215 OFFICE O/P EST HI 40 MIN: CPT | Mod: S$GLB,,, | Performed by: INTERNAL MEDICINE

## 2024-11-01 RX ORDER — FLUTICASONE PROPIONATE 50 MCG
1 SPRAY, SUSPENSION (ML) NASAL DAILY
COMMUNITY

## 2024-11-02 PROBLEM — G47.33 OSA (OBSTRUCTIVE SLEEP APNEA): Status: ACTIVE | Noted: 2024-11-02

## 2024-11-04 ENCOUNTER — TELEPHONE (OUTPATIENT)
Dept: PULMONOLOGY | Facility: CLINIC | Age: 74
End: 2024-11-04
Payer: OTHER GOVERNMENT

## 2024-11-04 NOTE — TELEPHONE ENCOUNTER
Left message to call office back.    SHERRY Velazquez/[facility] [place]  [phone]           ----- Message from Med Assistant Caroline sent at 11/4/2024  8:20 AM CST -----  Francis Mckinley MD P Pham [facility] V Staff  Caller: Unspecified (2 days ago, 12:07 AM)  Please advise patient that I would like to repeat cxr to ensure that pneumonia in 7/2024 resolve.

## 2024-11-08 ENCOUNTER — TELEPHONE (OUTPATIENT)
Dept: PULMONOLOGY | Facility: CLINIC | Age: 74
End: 2024-11-08
Payer: OTHER GOVERNMENT

## 2024-11-08 NOTE — TELEPHONE ENCOUNTER
Left message to call office back.    SHERRY Velazquez/Sleep [facility]  [phone]               ----- Message from Med Assistant Caroline sent at 11/4/2024  8:20 AM CST -----  Francis Mckinley MD P Pham Chung V Staff  Caller: Unspecified (2 days ago, 12:07 AM)  Please advise patient that I would like to repeat cxr to ensure that pneumonia in 7/2024 resolve.

## 2024-11-11 ENCOUNTER — TELEPHONE (OUTPATIENT)
Dept: PULMONOLOGY | Facility: CLINIC | Age: 74
End: 2024-11-11
Payer: OTHER GOVERNMENT

## 2024-11-11 NOTE — TELEPHONE ENCOUNTER
Left message to call office back, mailed letter as well to contact the office.    SHERRY Velazquez/[facility]  [phone]                   ----- Message from Med Assistant Velazquez sent at 11/4/2024  8:20 AM CST -----  Francis Mckinley MD P Ph[facility] Staff  Caller: Unspecified (2 days ago, 12:07 AM)  Please advise patient that I would like to repeat cxr to ensure that pneumonia in 7/2024 resolve.

## 2024-11-15 ENCOUNTER — TELEPHONE (OUTPATIENT)
Dept: PULMONOLOGY | Facility: CLINIC | Age: 74
End: 2024-11-15
Payer: OTHER GOVERNMENT

## 2024-11-15 NOTE — TELEPHONE ENCOUNTER
Returned call he stated he is in the bank and will call back.                ----- Message from Med Assistant Idget sent at 11/15/2024 12:01 PM CST -----  Octavia Jhaveri Staff  Caller: Unspecified (Today, 11:50 AM)  Type: Patient Call Back    What is the request in detail:Pt would like to schedule an appt , He received a letter stating someone was supposed to call him to schedule an appt pt never received a call , and no appts are populating in system.    Can the clinic reply by [employer]? No    Would the patient rather a call back or a response via [facility]? Call back    Best call back number: ..[phone]        Additional Information: Pt would be out of town soon so call to verify if appt works before scheduling his 6 month fu      Thank you.

## 2024-11-15 NOTE — TELEPHONE ENCOUNTER
Spoke with patient about a repeat xray he states he feels he does not need one because if he was still sick then he would be back in the hosp, however he is going out of town and will not return until sometime in Dec.2024, he also stated that he had a repeat xray before he left the hosp in July. Message sent to Dr. Mckinley.    SHERRY Velazquez  [facility]  [phone]

## 2025-02-17 ENCOUNTER — HOSPITAL ENCOUNTER (OUTPATIENT)
Dept: PULMONOLOGY | Facility: HOSPITAL | Age: 75
Discharge: HOME | End: 2025-02-17
Attending: INTERNAL MEDICINE
Payer: OTHER GOVERNMENT

## 2025-02-17 ENCOUNTER — OFFICE VISIT (OUTPATIENT)
Dept: PULMONOLOGY | Facility: CLINIC | Age: 75
End: 2025-02-17
Payer: OTHER GOVERNMENT

## 2025-02-17 VITALS
SYSTOLIC BLOOD PRESSURE: 142 MMHG | OXYGEN SATURATION: 98 % | HEART RATE: 73 BPM | WEIGHT: 241 LBS | HEIGHT: 71 IN | BODY MASS INDEX: 33.74 KG/M2 | DIASTOLIC BLOOD PRESSURE: 84 MMHG

## 2025-02-17 DIAGNOSIS — J45.40 MODERATE PERSISTENT ASTHMA WITHOUT COMPLICATION: ICD-10-CM

## 2025-02-17 DIAGNOSIS — D72.10 EOSINOPHILIA, UNSPECIFIED TYPE: ICD-10-CM

## 2025-02-17 DIAGNOSIS — J98.4 RESTRICTIVE LUNG DISEASE: Primary | ICD-10-CM

## 2025-02-17 DIAGNOSIS — J96.02 ACUTE HYPERCAPNIC RESPIRATORY FAILURE: ICD-10-CM

## 2025-02-17 DIAGNOSIS — J21.9 ACUTE BRONCHIOLITIS DUE TO UNSPECIFIED ORGANISM: ICD-10-CM

## 2025-02-17 LAB
DLCO SINGLE BREATH LLN: 20.11
DLCO SINGLE BREATH PRE REF: 74.4 %
DLCO SINGLE BREATH REF: 27.04
DLCOC SBVA LLN: 2.58
DLCOC SBVA REF: 3.69
DLCOC SINGLE BREATH LLN: 20.11
DLCOC SINGLE BREATH REF: 27.04
DLCOCSBVAULN: 4.8
DLCOCSINGLEBREATHULN: 33.97
DLCOSINGLEBREATHULN: 33.97
DLCOSINGLEBREATHZSCORE: -1.65
DLCOVA LLN: 2.58
DLCOVA PRE REF: 141.8 %
DLCOVA PRE: 5.23 ML/(MIN*MMHG*L) (ref 2.58–4.8)
DLCOVA REF: 3.69
DLCOVAULN: 4.8
ERV LLN: -16448.98
ERV PRE REF: 64.8 %
ERV REF: 1.02
ERVULN: ABNORMAL
FEF 25 75 LLN: 1.26
FEF 25 75 PRE REF: 31.3 %
FEF 25 75 REF: 2.97
FET100 CHG: 1.6 %
FEV05 LLN: 1.52
FEV05 REF: 2.65
FEV1 CHG: 4.3 %
FEV1 FVC LLN: 62
FEV1 FVC PRE REF: 87.3 %
FEV1 FVC REF: 76
FEV1 LLN: 1.77
FEV1 PRE REF: 64.3 %
FEV1 REF: 2.65
FEV1 VOL CHG: 0.07
FEV1FVCZSCORE: -1.17
FEV1ZSCORE: -1.77
FRCPLETH LLN: 2.82
FRCPLETH PREREF: 88.1 %
FRCPLETH REF: 3.8
FRCPLETHULN: 4.79
FVC CHG: -2.7 %
FVC LLN: 2.49
FVC PRE REF: 73.1 %
FVC REF: 3.52
FVC VOL CHG: -0.07
FVCZSCORE: -1.51
IVC PRE: 2.56 L (ref 2.49–4.57)
IVC SINGLE BREATH LLN: 2.49
IVC SINGLE BREATH PRE REF: 72.6 %
IVC SINGLE BREATH REF: 3.52
IVCSINGLEBREATHULN: 4.57
LLN IC: -9999996.77
PEF LLN: 5.32
PEF PRE REF: 68.1 %
PEF REF: 8.05
PHYSICIAN COMMENT: ABNORMAL
POST FEF 25 75: 1.1 L/S (ref 1.26–4.68)
POST FET 100: 7.59 SEC
POST FEV1 FVC: 70.72 % (ref 61.85–87.94)
POST FEV1: 1.77 L (ref 1.77–3.45)
POST FEV5: 1.39 L (ref 1.52–3.79)
POST FVC: 2.51 L (ref 2.49–4.57)
POST PEF: 6.65 L/S (ref 5.32–10.78)
PRE DLCO: 20.11 ML/(MIN*MMHG) (ref 20.11–33.97)
PRE ERV: 0.66 L (ref -16448.98–16451.02)
PRE FEF 25 75: 0.93 L/S (ref 1.26–4.68)
PRE FET 100: 7.47 SEC
PRE FEV05 REF: 48 %
PRE FEV1 FVC: 66 % (ref 61.85–87.94)
PRE FEV1: 1.7 L (ref 1.77–3.45)
PRE FEV5: 1.27 L (ref 1.52–3.79)
PRE FRC PL: 3.35 L (ref 2.82–4.79)
PRE FVC: 2.58 L (ref 2.49–4.57)
PRE IC: 1.91 L (ref -9999996.77–#######.####)
PRE PEF: 5.48 L/S (ref 5.32–10.78)
PRE REF IC: 59.3 %
PRE RV: 2.69 L (ref 2.11–3.46)
PRE TLC: 5.27 L (ref 6.18–8.48)
RAW PRE REF: 160.5 %
RAW PRE: 4.91 CMH2O*S/L (ref 3.06–3.06)
RAW REF: 3.06
REF IC: 3.23
RV LLN: 2.11
RV PRE REF: 96.6 %
RV REF: 2.78
RVTLC LLN: 34
RVTLC PRE REF: 118.2 %
RVTLC PRE: 51.06 % (ref 34.23–52.19)
RVTLC REF: 43
RVTLCULN: 52
RVULN: 3.46
SGAW PRE REF: 64.6 %
SGAW PRE: 0.05 1/(CMH2O*S) (ref 0.08–0.08)
SGAW REF: 0.08
TLC LLN: 6.18
TLC PRE REF: 71.8 %
TLC REF: 7.33
TLC ULN: 8.48
TLCZSCORE: -2.95
ULN IC: ABNORMAL
VA PRE: 3.84 L (ref 7.18–7.18)
VA SINGLE BREATH LLN: 7.18
VA SINGLE BREATH PRE REF: 53.5 %
VA SINGLE BREATH REF: 7.18
VASINGLEBREATHULN: 7.18
VC LLN: 2.49
VC PRE REF: 73.1 %
VC PRE: 2.58 L (ref 2.49–4.57)
VC REF: 3.52
VC ULN: 4.57

## 2025-02-17 PROCEDURE — 94060 EVALUATION OF WHEEZING: CPT | Performed by: INTERNAL MEDICINE

## 2025-02-17 PROCEDURE — 94726 PLETHYSMOGRAPHY LUNG VOLUMES: CPT | Performed by: INTERNAL MEDICINE

## 2025-02-17 PROCEDURE — 94729 DIFFUSING CAPACITY: CPT | Performed by: INTERNAL MEDICINE

## 2025-02-17 PROCEDURE — 99214 OFFICE O/P EST MOD 30 MIN: CPT | Mod: S$GLB,,, | Performed by: INTERNAL MEDICINE

## 2025-02-17 PROCEDURE — G2211 COMPLEX E/M VISIT ADD ON: HCPCS | Mod: S$GLB,,, | Performed by: INTERNAL MEDICINE

## 2025-02-17 RX ORDER — AZITHROMYCIN 250 MG/1
TABLET, FILM COATED ORAL
Qty: 6 TABLET | Refills: 0 | Status: SHIPPED | OUTPATIENT
Start: 2025-02-17 | End: 2025-02-22

## 2025-02-17 NOTE — ASSESSMENT & PLAN NOTE
Significant chronic condition to be followed longitudinally with following long term treatment plan.      Symptoms greatly improved but with severe flair in mid 2024 and PFT changes, will maintain on daily ICS long term.  PRN albuterol. Went over inhaler tachnique.    -mild flair currently and will treat with Zpack

## 2025-02-17 NOTE — ASSESSMENT & PLAN NOTE
Obtain CT Chest to assess for any underlying ILD with his work history. Trend PFTs with repeat testing in 6 months

## 2025-02-17 NOTE — PROGRESS NOTES
Subjective:       Patient ID: Kai Guerrero is a 74 y.o. male.  The patient's last visit with me was on 8/12/2024.     No new complaints. LE edema has resolved. Occasional asthma symptoms with weather chagnes and currently with increased cough and chest tightness for last week. Has not been using the albuterol with the increased symptoms. Using ICS every day. No other complaints or concerns.    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  Review of Systems    Objective:      Vitals:    02/17/25 1113   BP: (!) 142/84   Pulse: 73     Wt Readings from Last 3 Encounters:   02/17/25 109.3 kg (241 lb)   11/01/24 110.5 kg (243 lb 8 oz)   08/12/24 105.7 kg (233 lb)     Temp Readings from Last 3 Encounters:   07/06/24 98.3 °F (36.8 °C) (Oral)     BP Readings from Last 3 Encounters:   02/17/25 (!) 142/84   11/01/24 126/80   08/12/24 106/68     Pulse Readings from Last 3 Encounters:   02/17/25 73   11/01/24 91   08/12/24 80       Physical Exam   Constitutional: He appears well-developed and well-nourished.   Pulmonary/Chest: Normal expansion and effort normal. He has rhonchi.   Vitals reviewed.    CBC  Lab Results   Component Value Date    WBC 15.72 (H) 07/06/2024    HGB 13.3 (L) 07/06/2024    HCT 43.4 07/06/2024    MCV 93 07/06/2024     07/06/2024         CMP  Sodium   Date Value Ref Range Status   07/06/2024 138 136 - 145 mmol/L Final     Potassium   Date Value Ref Range Status   07/06/2024 4.9 3.5 - 5.1 mmol/L Final     Chloride   Date Value Ref Range Status   07/06/2024 105 95 - 110 mmol/L Final     CO2   Date Value Ref Range Status   07/06/2024 23 23 - 29 mmol/L Final     Glucose   Date Value Ref Range Status   07/06/2024 99 70 - 110 mg/dL Final     BUN   Date Value Ref Range Status   07/06/2024 18 8 - 23 mg/dL Final     Creatinine   Date Value Ref Range Status   07/06/2024 0.8 0.5 - 1.4 mg/dL Final     Calcium   Date Value Ref Range Status   07/06/2024 8.7 8.7 - 10.5 mg/dL Final     Total  "Protein   Date Value Ref Range Status   07/04/2024 8.8 (H) 6.0 - 8.4 g/dL Final     Albumin   Date Value Ref Range Status   07/04/2024 3.9 3.5 - 5.2 g/dL Final     Total Bilirubin   Date Value Ref Range Status   07/04/2024 0.6 0.1 - 1.0 mg/dL Final     Comment:     For infants and newborns, interpretation of results should be based  on gestational age, weight and in agreement with clinical  observations.    Premature Infant recommended reference ranges:  Up to 24 hours.............<8.0 mg/dL  Up to 48 hours............<12.0 mg/dL  3-5 days..................<15.0 mg/dL  6-29 days.................<15.0 mg/dL       Alkaline Phosphatase   Date Value Ref Range Status   07/04/2024 86 55 - 135 U/L Final     AST   Date Value Ref Range Status   07/04/2024 27 10 - 40 U/L Final     ALT   Date Value Ref Range Status   07/04/2024 23 10 - 44 U/L Final     Anion Gap   Date Value Ref Range Status   07/06/2024 10 8 - 16 mmol/L Final     eGFR   Date Value Ref Range Status   07/06/2024 >60 >60 mL/min/1.73 m^2 Final       ABG  POC PH   Date Value Ref Range Status   07/04/2024 7.259 (LL) 7.35 - 7.45 Final     POC PO2   Date Value Ref Range Status   07/04/2024 195 (H) 80 - 100 mmHg Final     POC PCO2   Date Value Ref Range Status   07/04/2024 60.6 (HH) 35 - 45 mmHg Final           Personal Diagnostic Review  I have personally reviewed the following data and added my own interpretation as below:  none pertinent      2/17/2025    11:13 AM 11/1/2024     9:06 AM 8/12/2024     9:55 AM 7/6/2024    11:05 AM 7/6/2024     7:38 AM 7/6/2024     7:33 AM 7/6/2024     4:01 AM   Pulmonary Function Tests   SpO2 98 % 97 % 96 % 98 % 96 % 94 % 97 %   Height 5' 11" (1.803 m) 5' 11" (1.803 m) 5' 11" (1.803 m)       Weight 109.3 kg (241 lb) 110.5 kg (243 lb 8 oz) 105.7 kg (233 lb)       BMI (Calculated) 33.6 34 32.5             Assessment:       1. Restrictive lung disease    2. Moderate persistent asthma without complication    3. Eosinophilia, unspecified " type        Encounter Medications[1]  1. Restrictive lung disease  - CT Chest Without Contrast; Future  - Complete PFT with bronchodilator; Future  - CBC Auto Differential; Future    2. Moderate persistent asthma without complication    3. Eosinophilia, unspecified type    Plan:     Problem List Items Addressed This Visit          Pulmonary    Moderate persistent asthma without complication    Overview   Doing well with ics.    Mild hazziness right base.  Will repeat cxr.           Current Assessment & Plan   Significant chronic condition to be followed longitudinally with following long term treatment plan.      Symptoms greatly improved but with severe flair in mid 2024 and PFT changes, will maintain on daily ICS long term.  PRN albuterol. Went over inhaler tachnique.    -mild flair currently and will treat with Zpack         Restrictive lung disease - Primary    Current Assessment & Plan   Obtain CT Chest to assess for any underlying ILD with his work history. Trend PFTs with repeat testing in 6 months         Relevant Orders    CT Chest Without Contrast    Complete PFT with bronchodilator    CBC Auto Differential       Oncology    Eosinophilia    Overview   1100 eosinophils per cbc  Improve per repeat cbc.           Current Assessment & Plan   Possibly asthma related. ? Exposure.  -repeat now            Please note Overview Notes are historic documentation. Please review A/P for current updates.  No follow-ups on file.    No future appointments.      Jewel Street MD             [1]  Outpatient Encounter Medications as of 2/17/2025   Medication Sig Dispense Refill    albuterol (VENTOLIN HFA) 90 mcg/actuation inhaler Inhale 2 puffs into the lungs every 4 (four) hours as needed for Wheezing. Rescue 18 g 11    fluticasone furoate (ARNUITY ELLIPTA) 200 mcg/actuation inhaler Inhale 1 puff into the lungs once daily. Controller 60 each 11    azithromycin (Z-NEHEMIAS) 250 MG tablet Take 2 tablets (500 mg total) by  mouth once daily for 1 day, THEN 1 tablet (250 mg total) once daily for 4 days. 6 tablet 0    fluticasone propionate (FLONASE) 50 mcg/actuation nasal spray 1 spray by Each Nostril route once daily. (Patient not taking: Reported on 2/17/2025)      furosemide (LASIX) 20 MG tablet Take 1 tablet (20 mg total) by mouth once daily. (Patient not taking: Reported on 2/17/2025) 30 tablet 0     No facility-administered encounter medications on file as of 2/17/2025.

## 2025-02-20 ENCOUNTER — HOSPITAL ENCOUNTER (OUTPATIENT)
Dept: RADIOLOGY | Facility: HOSPITAL | Age: 75
Discharge: HOME | End: 2025-02-20
Attending: INTERNAL MEDICINE
Payer: OTHER GOVERNMENT

## 2025-02-20 DIAGNOSIS — J98.4 RESTRICTIVE LUNG DISEASE: ICD-10-CM

## 2025-02-20 PROCEDURE — 71250 CT THORAX DX C-: CPT | Mod: TC

## 2025-04-15 ENCOUNTER — RESULTS FOLLOW-UP (OUTPATIENT)
Dept: PULMONOLOGY | Facility: CLINIC | Age: 75
End: 2025-04-15

## 2025-04-23 ENCOUNTER — OFFICE VISIT (OUTPATIENT)
Dept: PULMONOLOGY | Facility: CLINIC | Age: 75
End: 2025-04-23
Payer: OTHER GOVERNMENT

## 2025-04-23 VITALS
WEIGHT: 245.38 LBS | SYSTOLIC BLOOD PRESSURE: 116 MMHG | DIASTOLIC BLOOD PRESSURE: 76 MMHG | OXYGEN SATURATION: 96 % | BODY MASS INDEX: 34.35 KG/M2 | HEART RATE: 81 BPM | HEIGHT: 71 IN

## 2025-04-23 DIAGNOSIS — J98.4 RESTRICTIVE LUNG DISEASE: ICD-10-CM

## 2025-04-23 DIAGNOSIS — R91.8 ENDOBRONCHIAL MASS: Primary | ICD-10-CM

## 2025-04-23 DIAGNOSIS — J45.40 MODERATE PERSISTENT ASTHMA WITHOUT COMPLICATION: ICD-10-CM

## 2025-04-23 PROCEDURE — G2211 COMPLEX E/M VISIT ADD ON: HCPCS | Mod: S$GLB,,, | Performed by: INTERNAL MEDICINE

## 2025-04-23 PROCEDURE — 99999 PR PBB SHADOW E&M-EST. PATIENT-LVL III: CPT | Mod: PBBFAC,,, | Performed by: INTERNAL MEDICINE

## 2025-04-23 PROCEDURE — 99214 OFFICE O/P EST MOD 30 MIN: CPT | Mod: S$GLB,,, | Performed by: INTERNAL MEDICINE

## 2025-04-23 NOTE — ASSESSMENT & PLAN NOTE
Significant chronic condition to be followed longitudinally with following long term treatment plan.      Symptoms greatly improved but with severe flair in mid 2024 and PFT changes, will maintain on daily ICS long term.  PRN albuterol. Went over inhaler tachnique.    -symptoms likely exacerbated by the endobronchial lesion.

## 2025-04-23 NOTE — ASSESSMENT & PLAN NOTE
CT without ILD. Trend PFTs with repeat testing after the endobronchial lesion is dealt with and he is fully recovered

## 2025-04-23 NOTE — ASSESSMENT & PLAN NOTE
Fat containing lesion in RUL airway associated with mucus plugging and bronchiectasis.  Most likely benign - lipoma or hamartoma; however, cannot rule out carcinoid or malignancy  -Bronchscopsy with moderate sedation and possible endobronchial biopsy now  -depending on etiology would recommend endobronchial cautery versus surgical resection for removal.

## 2025-04-23 NOTE — PROGRESS NOTES
Subjective:       Patient ID: Kai Guerrero is a 75 y.o. male.  The patient's last visit with me was on 2/17/2025.     No new complaints. Received his letter regarding the CT scan and presents for follow up. Has been having issues with the [facility] and getting assistance with this later today.    Discussed the airway lesion noted on his CT scan. We discussed how I feel this lesion is contributing to his respiratory symptoms. We discussed that this is most likely not a cancer but that over time I am concerned that it will continue to cause worsening symptoms and will likely lead to recurrent infections. We also discussed that we need to rule out cancer completely.     Patient agrees with bronchoscopy as next step but needs to coordinate transportation. We will reach out to me next week to finalize a bronchoscopy date.    Follow-up  Review of Systems    Objective:      Vitals:    04/23/25 1311   BP: 116/76   Pulse: 81     Wt Readings from Last 3 Encounters:   04/23/25 111.3 kg (245 lb 6 oz)   02/17/25 109.3 kg (241 lb)   11/01/24 110.5 kg (243 lb 8 oz)     Temp Readings from Last 3 Encounters:   07/06/24 98.3 °F (36.8 °C) (Oral)     BP Readings from Last 3 Encounters:   04/23/25 116/76   02/17/25 (!) 142/84   11/01/24 126/80     Pulse Readings from Last 3 Encounters:   04/23/25 81   02/17/25 73   11/01/24 91       Physical Exam   Constitutional: He is oriented to person, place, and time. He appears well-developed and well-nourished.   HENT:   Head: Normocephalic.   Mouth/Throat: Oropharynx is clear and moist.   Cardiovascular: Normal rate and regular rhythm.   Pulmonary/Chest: Normal expansion, symmetric chest wall expansion, effort normal and breath sounds normal. He has no wheezes. He has no rhonchi. He has no rales.   Abdominal: Soft. He exhibits no distension.   Musculoskeletal:         General: No edema. Normal range of motion.   Lymphadenopathy: No supraclavicular adenopathy is present.     He has no cervical  adenopathy.   Neurological: He is alert and oriented to person, place, and time. Gait normal.   Skin: Skin is warm and dry. No rash noted.   Psychiatric: He has a normal mood and affect. His behavior is normal. Thought content normal.   Vitals reviewed.    CBC  Lab Results   Component Value Date    WBC 15.72 (H) 07/06/2024    HGB 13.3 (L) 07/06/2024    HCT 43.4 07/06/2024    MCV 93 07/06/2024     07/06/2024         CMP  Sodium   Date Value Ref Range Status   07/06/2024 138 136 - 145 mmol/L Final     Potassium   Date Value Ref Range Status   07/06/2024 4.9 3.5 - 5.1 mmol/L Final     Chloride   Date Value Ref Range Status   07/06/2024 105 95 - 110 mmol/L Final     CO2   Date Value Ref Range Status   07/06/2024 23 23 - 29 mmol/L Final     Glucose   Date Value Ref Range Status   07/06/2024 99 70 - 110 mg/dL Final     BUN   Date Value Ref Range Status   07/06/2024 18 8 - 23 mg/dL Final     Creatinine   Date Value Ref Range Status   07/06/2024 0.8 0.5 - 1.4 mg/dL Final     Calcium   Date Value Ref Range Status   07/06/2024 8.7 8.7 - 10.5 mg/dL Final     Total Protein   Date Value Ref Range Status   07/04/2024 8.8 (H) 6.0 - 8.4 g/dL Final     Albumin   Date Value Ref Range Status   07/04/2024 3.9 3.5 - 5.2 g/dL Final     Total Bilirubin   Date Value Ref Range Status   07/04/2024 0.6 0.1 - 1.0 mg/dL Final     Comment:     For infants and newborns, interpretation of results should be based  on gestational age, weight and in agreement with clinical  observations.    Premature Infant recommended reference ranges:  Up to 24 hours.............<8.0 mg/dL  Up to 48 hours............<12.0 mg/dL  3-5 days..................<15.0 mg/dL  6-29 days.................<15.0 mg/dL       Alkaline Phosphatase   Date Value Ref Range Status   07/04/2024 86 55 - 135 U/L Final     AST   Date Value Ref Range Status   07/04/2024 27 10 - 40 U/L Final     ALT   Date Value Ref Range Status   07/04/2024 23 10 - 44 U/L Final     Anion Gap   Date  "Value Ref Range Status   07/06/2024 10 8 - 16 mmol/L Final     eGFR   Date Value Ref Range Status   07/06/2024 >60 >60 mL/min/1.73 m^2 Final       ABG  POC PH   Date Value Ref Range Status   07/04/2024 7.259 (LL) 7.35 - 7.45 Final     POC PO2   Date Value Ref Range Status   07/04/2024 195 (H) 80 - 100 mmHg Final     POC PCO2   Date Value Ref Range Status   07/04/2024 60.6 (HH) 35 - 45 mmHg Final           Personal Diagnostic Review  I have personally reviewed the following data and added my own interpretation as below:  CT Chest 2/20/25 images personally reviewed and shows RUL fat containing endobronchial lesion mostly obstructing the airways with distal mucus plugging and bronchiectasis.      4/23/2025     1:11 PM 2/17/2025    11:13 AM 11/1/2024     9:06 AM 8/12/2024     9:55 AM 7/6/2024    11:05 AM 7/6/2024     7:38 AM 7/6/2024     7:33 AM   Pulmonary Function Tests   SpO2 96 % 98 % 97 % 96 % 98 % 96 % 94 %   Height 5' 11" (1.803 m) 5' 11" (1.803 m) 5' 11" (1.803 m) 5' 11" (1.803 m)      Weight 111.3 kg (245 lb 6 oz) 109.3 kg (241 lb) 110.5 kg (243 lb 8 oz) 105.7 kg (233 lb)      BMI (Calculated) 34.2 33.6 34 32.5            Assessment:       1. Endobronchial mass    2. Restrictive lung disease    3. Moderate persistent asthma without complication        Encounter Medications[1]  1. Endobronchial mass    2. Restrictive lung disease    3. Moderate persistent asthma without complication    Plan:     Problem List Items Addressed This Visit          Pulmonary    Moderate persistent asthma without complication    Overview   Doing well with ics.    Mild hazziness right base.  Will repeat cxr.           Current Assessment & Plan   Significant chronic condition to be followed longitudinally with following long term treatment plan.      Symptoms greatly improved but with severe flair in mid 2024 and PFT changes, will maintain on daily ICS long term.  PRN albuterol. Went over inhaler tachnique.    -symptoms likely " exacerbated by the endobronchial lesion.         Restrictive lung disease    Current Assessment & Plan   CT without ILD. Trend PFTs with repeat testing after the endobronchial lesion is dealt with and he is fully recovered         Endobronchial mass - Primary    Current Assessment & Plan   Fat containing lesion in RUL airway associated with mucus plugging and bronchiectasis.  Most likely benign - lipoma or hamartoma; however, cannot rule out carcinoid or malignancy  -Bronchscopsy with moderate sedation and possible endobronchial biopsy now  -depending on etiology would recommend endobronchial cautery versus surgical resection for removal.            Please note Overview Notes are historic documentation. Please review A/P for current updates.  No follow-ups on file.    No future appointments.      Jewel Street MD             [1]  Outpatient Encounter Medications as of 4/23/2025   Medication Sig Dispense Refill    albuterol (VENTOLIN HFA) 90 mcg/actuation inhaler Inhale 2 puffs into the lungs every 4 (four) hours as needed for Wheezing. Rescue 18 g 11    fluticasone furoate (ARNUITY ELLIPTA) 200 mcg/actuation inhaler Inhale 1 puff into the lungs once daily. Controller 60 each 11    fluticasone propionate (FLONASE) 50 mcg/actuation nasal spray 1 spray by Each Nostril route once daily.      furosemide (LASIX) 20 MG tablet Take 1 tablet (20 mg total) by mouth once daily. 30 tablet 0     No facility-administered encounter medications on file as of 4/23/2025.

## 2025-08-08 RX ORDER — ALBUTEROL SULFATE 90 UG/1
2 INHALANT RESPIRATORY (INHALATION) EVERY 4 HOURS PRN
Qty: 18 G | Refills: 11 | Status: SHIPPED | OUTPATIENT
Start: 2025-08-08 | End: 2026-08-08

## 2025-08-08 RX ORDER — FLUTICASONE FUROATE 200 UG/1
1 POWDER RESPIRATORY (INHALATION) DAILY
Qty: 60 EACH | Refills: 11 | Status: SHIPPED | OUTPATIENT
Start: 2025-08-08

## 2025-08-11 RX ORDER — FUROSEMIDE 20 MG/1
TABLET ORAL
Qty: 30 TABLET | Refills: 0 | Status: SHIPPED | OUTPATIENT
Start: 2025-08-11

## 2025-08-18 ENCOUNTER — HOSPITAL ENCOUNTER (OUTPATIENT)
Dept: PULMONOLOGY | Facility: HOSPITAL | Age: 75
Discharge: HOME | End: 2025-08-18
Attending: INTERNAL MEDICINE
Payer: COMMERCIAL

## 2025-08-18 ENCOUNTER — OFFICE VISIT (OUTPATIENT)
Dept: PULMONOLOGY | Facility: CLINIC | Age: 75
End: 2025-08-18
Payer: COMMERCIAL

## 2025-08-18 VITALS
DIASTOLIC BLOOD PRESSURE: 72 MMHG | HEART RATE: 82 BPM | OXYGEN SATURATION: 96 % | SYSTOLIC BLOOD PRESSURE: 111 MMHG | WEIGHT: 241 LBS | BODY MASS INDEX: 33.74 KG/M2 | HEIGHT: 71 IN

## 2025-08-18 DIAGNOSIS — R91.8 ENDOBRONCHIAL MASS: ICD-10-CM

## 2025-08-18 DIAGNOSIS — J98.4 RESTRICTIVE LUNG DISEASE: Primary | ICD-10-CM

## 2025-08-18 DIAGNOSIS — J98.4 RESTRICTIVE LUNG DISEASE: ICD-10-CM

## 2025-08-18 DIAGNOSIS — J45.40 MODERATE PERSISTENT ASTHMA WITHOUT COMPLICATION: ICD-10-CM

## 2025-08-18 LAB
DLCO SINGLE BREATH LLN: 19.92
DLCO SINGLE BREATH PRE REF: 84.1 %
DLCO SINGLE BREATH REF: 26.84
DLCOC SBVA LLN: 2.55
DLCOC SBVA REF: 3.66
DLCOC SINGLE BREATH LLN: 19.92
DLCOC SINGLE BREATH REF: 26.84
DLCOCSBVAULN: 4.77
DLCOCSINGLEBREATHULN: 33.77
DLCOSINGLEBREATHULN: 33.77
DLCOSINGLEBREATHZSCORE: -1.01
DLCOVA LLN: 2.55
DLCOVA PRE REF: 146.7 %
DLCOVA PRE: 5.37 ML/(MIN*MMHG*L) (ref 2.55–4.77)
DLCOVA REF: 3.66
DLCOVAULN: 4.77
ERV LLN: -16448.99
ERV PRE REF: 75.9 %
ERV REF: 1.01
ERVULN: ABNORMAL
FEF 25 75 LLN: 0.62
FEF 25 75 PRE REF: 58.5 %
FEF 25 75 REF: 1.93
FET100 CHG: 1.5 %
FEV05 LLN: 1.5
FEV05 REF: 2.63
FEV1 CHG: 8.9 %
FEV1 FVC LLN: 62
FEV1 FVC PRE REF: 91.1 %
FEV1 FVC REF: 76
FEV1 LLN: 1.76
FEV1 PRE REF: 73.8 %
FEV1 REF: 2.63
FEV1 VOL CHG: 0.17
FRCPLETH LLN: 2.83
FRCPLETH PREREF: 84.3 %
FRCPLETH REF: 3.81
FRCPLETHULN: 4.8
FVC CHG: 1.6 %
FVC LLN: 2.48
FVC PRE REF: 80.4 %
FVC REF: 3.51
FVC VOL CHG: 0.04
IVC PRE: 2.77 L (ref 2.48–4.55)
IVC SINGLE BREATH LLN: 2.48
IVC SINGLE BREATH PRE REF: 78.8 %
IVC SINGLE BREATH REF: 3.51
IVCSINGLEBREATHULN: 4.55
LLN IC: -9999996.79
PEF LLN: 5.28
PEF PRE REF: 101.2 %
PEF REF: 8.01
PHYSICIAN COMMENT: ABNORMAL
POST FEF 25 75: 1.47 L/S (ref 0.62–3.96)
POST FET 100: 8.32 SEC
POST FEV1 FVC: 73.71 % (ref 61.72–87.95)
POST FEV1: 2.11 L (ref 1.76–3.44)
POST FEV5: 1.72 L (ref 1.5–3.77)
POST FVC: 2.87 L (ref 2.48–4.55)
POST PEF: 8.96 L/S (ref 5.28–10.74)
PRE DLCO: 22.58 ML/(MIN*MMHG) (ref 19.92–33.77)
PRE ERV: 0.77 L (ref -16448.99–16451.01)
PRE FEF 25 75: 1.13 L/S (ref 0.62–3.96)
PRE FET 100: 8.2 SEC
PRE FEV05 REF: 57.6 %
PRE FEV1 FVC: 68.8 % (ref 61.72–87.95)
PRE FEV1: 1.94 L (ref 1.76–3.44)
PRE FEV5: 1.52 L (ref 1.5–3.77)
PRE FRC PL: 3.21 L (ref 2.83–4.8)
PRE FVC: 2.82 L (ref 2.48–4.55)
PRE IC: 2.06 L (ref -9999996.79–#######.####)
PRE PEF: 8.11 L/S (ref 5.28–10.74)
PRE REF IC: 64 %
PRE RV: 2.45 L (ref 2.13–3.48)
PRE TLC: 5.27 L (ref 6.18–8.48)
RAW PRE REF: 129 %
RAW PRE: 3.95 CMH2O*S/L (ref 3.06–3.06)
RAW REF: 3.06
REF IC: 3.21
RV LLN: 2.13
RV PRE REF: 87.3 %
RV REF: 2.8
RVTLC LLN: 35
RVTLC PRE REF: 106.6 %
RVTLC PRE: 46.46 % (ref 34.62–52.58)
RVTLC REF: 44
RVTLCULN: 53
RVULN: 3.48
SGAW PRE REF: 81.5 %
SGAW PRE: 0.07 1/(CMH2O*S) (ref 0.08–0.08)
SGAW REF: 0.08
TLC LLN: 6.18
TLC PRE REF: 71.9 %
TLC REF: 7.33
TLC ULN: 8.48
ULN IC: ABNORMAL
VA PRE: 4.2 L (ref 7.18–7.18)
VA SINGLE BREATH LLN: 7.18
VA SINGLE BREATH PRE REF: 58.5 %
VA SINGLE BREATH REF: 7.18
VASINGLEBREATHULN: 7.18
VC LLN: 2.48
VC PRE REF: 80.4 %
VC PRE: 2.82 L (ref 2.48–4.55)
VC REF: 3.51
VC ULN: 4.55

## 2025-08-18 PROCEDURE — 94060 EVALUATION OF WHEEZING: CPT

## 2025-08-18 PROCEDURE — 3078F DIAST BP <80 MM HG: CPT | Mod: CPTII,S$GLB,, | Performed by: INTERNAL MEDICINE

## 2025-08-18 PROCEDURE — 3288F FALL RISK ASSESSMENT DOCD: CPT | Mod: CPTII,S$GLB,, | Performed by: INTERNAL MEDICINE

## 2025-08-18 PROCEDURE — G2211 COMPLEX E/M VISIT ADD ON: HCPCS | Mod: S$GLB,,, | Performed by: INTERNAL MEDICINE

## 2025-08-18 PROCEDURE — 3074F SYST BP LT 130 MM HG: CPT | Mod: CPTII,S$GLB,, | Performed by: INTERNAL MEDICINE

## 2025-08-18 PROCEDURE — 94729 DIFFUSING CAPACITY: CPT

## 2025-08-18 PROCEDURE — 94726 PLETHYSMOGRAPHY LUNG VOLUMES: CPT | Mod: 26,,, | Performed by: INTERNAL MEDICINE

## 2025-08-18 PROCEDURE — 94726 PLETHYSMOGRAPHY LUNG VOLUMES: CPT

## 2025-08-18 PROCEDURE — 94729 DIFFUSING CAPACITY: CPT | Mod: 26,,, | Performed by: INTERNAL MEDICINE

## 2025-08-18 PROCEDURE — 94060 EVALUATION OF WHEEZING: CPT | Mod: 26,,, | Performed by: INTERNAL MEDICINE

## 2025-08-18 PROCEDURE — 1126F AMNT PAIN NOTED NONE PRSNT: CPT | Mod: CPTII,S$GLB,, | Performed by: INTERNAL MEDICINE

## 2025-08-18 PROCEDURE — 99214 OFFICE O/P EST MOD 30 MIN: CPT | Mod: 25,S$GLB,, | Performed by: INTERNAL MEDICINE

## 2025-08-18 PROCEDURE — 99999 PR PBB SHADOW E&M-EST. PATIENT-LVL III: CPT | Mod: PBBFAC,,, | Performed by: INTERNAL MEDICINE

## 2025-08-18 PROCEDURE — 1101F PT FALLS ASSESS-DOCD LE1/YR: CPT | Mod: CPTII,S$GLB,, | Performed by: INTERNAL MEDICINE
